# Patient Record
Sex: MALE | Race: WHITE | Employment: FULL TIME | URBAN - METROPOLITAN AREA
[De-identification: names, ages, dates, MRNs, and addresses within clinical notes are randomized per-mention and may not be internally consistent; named-entity substitution may affect disease eponyms.]

---

## 2018-01-09 PROBLEM — J18.9 PNEUMONIA: Status: ACTIVE | Noted: 2018-01-09

## 2021-06-09 ENCOUNTER — OFFICE VISIT (OUTPATIENT)
Dept: FAMILY MEDICINE CLINIC | Age: 31
End: 2021-06-09
Payer: COMMERCIAL

## 2021-06-09 VITALS
OXYGEN SATURATION: 98 % | WEIGHT: 174 LBS | BODY MASS INDEX: 23.57 KG/M2 | HEART RATE: 50 BPM | SYSTOLIC BLOOD PRESSURE: 112 MMHG | DIASTOLIC BLOOD PRESSURE: 62 MMHG | HEIGHT: 72 IN

## 2021-06-09 DIAGNOSIS — B18.2 CHRONIC HEPATITIS C WITHOUT HEPATIC COMA (HCC): ICD-10-CM

## 2021-06-09 DIAGNOSIS — F11.10 HEROIN ABUSE (HCC): ICD-10-CM

## 2021-06-09 DIAGNOSIS — R55 SYNCOPE, UNSPECIFIED SYNCOPE TYPE: Primary | ICD-10-CM

## 2021-06-09 PROCEDURE — G8420 CALC BMI NORM PARAMETERS: HCPCS | Performed by: NURSE PRACTITIONER

## 2021-06-09 PROCEDURE — 99204 OFFICE O/P NEW MOD 45 MIN: CPT | Performed by: NURSE PRACTITIONER

## 2021-06-09 PROCEDURE — G8427 DOCREV CUR MEDS BY ELIG CLIN: HCPCS | Performed by: NURSE PRACTITIONER

## 2021-06-09 PROCEDURE — 4004F PT TOBACCO SCREEN RCVD TLK: CPT | Performed by: NURSE PRACTITIONER

## 2021-06-09 RX ORDER — BUPRENORPHINE AND NALOXONE 8; 2 MG/1; MG/1
1 FILM, SOLUBLE BUCCAL; SUBLINGUAL DAILY
COMMUNITY

## 2021-06-09 SDOH — ECONOMIC STABILITY: FOOD INSECURITY: WITHIN THE PAST 12 MONTHS, THE FOOD YOU BOUGHT JUST DIDN'T LAST AND YOU DIDN'T HAVE MONEY TO GET MORE.: NEVER TRUE

## 2021-06-09 SDOH — ECONOMIC STABILITY: FOOD INSECURITY: WITHIN THE PAST 12 MONTHS, YOU WORRIED THAT YOUR FOOD WOULD RUN OUT BEFORE YOU GOT MONEY TO BUY MORE.: NEVER TRUE

## 2021-06-09 ASSESSMENT — ENCOUNTER SYMPTOMS
SHORTNESS OF BREATH: 0
VOMITING: 0
DIARRHEA: 0
NAUSEA: 0
COUGH: 0

## 2021-06-09 ASSESSMENT — PATIENT HEALTH QUESTIONNAIRE - PHQ9
SUM OF ALL RESPONSES TO PHQ9 QUESTIONS 1 & 2: 2
2. FEELING DOWN, DEPRESSED OR HOPELESS: 1
SUM OF ALL RESPONSES TO PHQ QUESTIONS 1-9: 2
SUM OF ALL RESPONSES TO PHQ QUESTIONS 1-9: 2
1. LITTLE INTEREST OR PLEASURE IN DOING THINGS: 1
SUM OF ALL RESPONSES TO PHQ QUESTIONS 1-9: 2

## 2021-06-09 ASSESSMENT — SOCIAL DETERMINANTS OF HEALTH (SDOH): HOW HARD IS IT FOR YOU TO PAY FOR THE VERY BASICS LIKE FOOD, HOUSING, MEDICAL CARE, AND HEATING?: SOMEWHAT HARD

## 2021-06-09 NOTE — PROGRESS NOTES
Packs/day: 1.00     Types: Cigarettes     Quit date: 1/1/2018     Years since quitting: 3.4    Smokeless tobacco: Current User   Substance Use Topics    Alcohol use: No        Vitals:    06/09/21 0808   BP: 112/62   Site: Left Upper Arm   Position: Sitting   Cuff Size: Medium Adult   Pulse: 50   SpO2: 98%   Weight: 174 lb (78.9 kg)   Height: 6' (1.829 m)     Estimated body mass index is 23.6 kg/m² as calculated from the following:    Height as of this encounter: 6' (1.829 m). Weight as of this encounter: 174 lb (78.9 kg). Physical Exam  Vitals and nursing note reviewed. Constitutional:       General: He is not in acute distress. Appearance: Normal appearance. He is well-developed. He is not ill-appearing, toxic-appearing or diaphoretic. HENT:      Head: Normocephalic and atraumatic. Eyes:      Conjunctiva/sclera: Conjunctivae normal.      Pupils: Pupils are equal, round, and reactive to light. Neck:      Vascular: No carotid bruit. Cardiovascular:      Rate and Rhythm: Normal rate and regular rhythm. Heart sounds: Normal heart sounds. No murmur heard. No friction rub. No gallop. Pulmonary:      Effort: Pulmonary effort is normal. No respiratory distress. Breath sounds: Normal breath sounds. No stridor. No wheezing, rhonchi or rales. Abdominal:      General: Abdomen is flat. Bowel sounds are normal. There is no distension. Palpations: Abdomen is soft. There is no mass. Tenderness: There is no abdominal tenderness. There is no guarding or rebound. Hernia: No hernia is present. Musculoskeletal:      Right lower leg: No edema. Left lower leg: No edema. Neurological:      General: No focal deficit present. Mental Status: He is alert and oriented to person, place, and time. Mental status is at baseline. Cranial Nerves: No cranial nerve deficit. ASSESSMENT/PLAN:  1.  Syncope, unspecified syncope type  - [address] [facility]    2. Chronic hepatitis C without hepatic coma (HCC)  - HEPATIC FUNCTION PANEL; Future  - HEPATITIS C RNA, QUANTITATIVE, PCR; Future    3. Heroin abuse ([address] in remission- on suboxone, reportedly clean since 2018      Referral to cardiology for syncope and bradycardia. Additionally put in referral to neurology as patient reports seizure-like activity. Ordered hepatitis C quant and hepatic function panel for further evaluation of his chronic hepatitis C. An electronic signature was used to authenticate this note.     --Crista Appiah, DESMOND - CNP on 6/9/2021 at 9:15 AM

## 2021-06-30 ENCOUNTER — OFFICE VISIT (OUTPATIENT)
Dept: CARDIOLOGY CLINIC | Age: 31
End: 2021-06-30
Payer: COMMERCIAL

## 2021-06-30 ENCOUNTER — TELEPHONE (OUTPATIENT)
Dept: CARDIOLOGY CLINIC | Age: 31
End: 2021-06-30

## 2021-06-30 VITALS
WEIGHT: 171.5 LBS | HEART RATE: 66 BPM | SYSTOLIC BLOOD PRESSURE: 100 MMHG | HEIGHT: 72 IN | DIASTOLIC BLOOD PRESSURE: 52 MMHG | OXYGEN SATURATION: 98 % | BODY MASS INDEX: 23.23 KG/M2

## 2021-06-30 DIAGNOSIS — R42 DIZZINESS: ICD-10-CM

## 2021-06-30 DIAGNOSIS — R55 SYNCOPE, UNSPECIFIED SYNCOPE TYPE: Primary | ICD-10-CM

## 2021-06-30 PROCEDURE — 93000 ELECTROCARDIOGRAM COMPLETE: CPT | Performed by: INTERNAL MEDICINE

## 2021-06-30 PROCEDURE — 99244 OFF/OP CNSLTJ NEW/EST MOD 40: CPT | Performed by: INTERNAL MEDICINE

## 2021-06-30 PROCEDURE — 93270 REMOTE 30 DAY ECG REV/REPORT: CPT | Performed by: INTERNAL MEDICINE

## 2021-06-30 PROCEDURE — G8427 DOCREV CUR MEDS BY ELIG CLIN: HCPCS | Performed by: INTERNAL MEDICINE

## 2021-06-30 PROCEDURE — G8420 CALC BMI NORM PARAMETERS: HCPCS | Performed by: INTERNAL MEDICINE

## 2021-06-30 NOTE — LETTER
[facility]   Cardiac Consultation    Referring Provider:  DESMOND Abdalla - JENNIFER     Chief Complaint   Patient presents with    New Patient    Loss of Consciousness        History of Present Illness:   Ayad Duncan 1990 is here as a NEW patient for syncope. He has a history of Hep C and heroin abuse. His echo from 01/10/18 showed his EF was 55%. Cannot r/o vegetation. AI recommend but not completed. Today he was started on suboxone in 04/2021. He had four episodes of syncope after waking for about 10 mins. His mother states he was unresponsive, jerking, eyes rolling, and incontinence. He denies biting tongue. He will become real stiff. The episodes last about 1.5 minutes. His last episode was on Monday 06/28/21. The episodes only occur in the morning. He was sitting during the episode. No ppt event. Resolved spontaneously. He reports occasional dizziness at work. He tries to stay hydrated. He denies CP, SOB, palpitations, or syncope. 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 He is still smoking and uses marijuana. Past Medical History:   has a past medical history of Drug abuse ([address] Surgical History:   has a past surgical history that includes Mandible surgery. Social History:   reports that he has been smoking cigarettes. He has been smoking about 1.00 pack per day. He uses smokeless tobacco. He reports current drug use. Drug: Marijuana. He reports that he does not drink alcohol. Family History:  Great grandparents with CAD age 72. Home Medications:  Prior to Admission medications    Medication Sig Start Date End Date Taking? Authorizing Provider   buprenorphine-naloxone (SUBOXONE) 8-2 MG FILM SL film Place 1 Film under the tongue daily. Yes Historical Provider, MD        Allergies:  Patient has no known allergies. Review of Systems:   · Constitutional: there has been no unanticipated weight loss.  There's been no change in energy level, sleep pattern, or activity level. · Eyes: No visual changes or diplopia. No scleral icterus. · ENT: No Headaches, hearing loss or vertigo. No mouth sores or sore throat. · Cardiovascular: Reviewed in HPI  · Respiratory: No cough or wheezing, no sputum production. No hematemesis. · Gastrointestinal: No abdominal pain, appetite loss, blood in stools. No change in bowel or bladder habits. · Genitourinary: No dysuria, trouble voiding, or hematuria. · Musculoskeletal:  No gait disturbance, weakness or joint complaints. · Integumentary: No rash or pruritis. · Neurological: No headache, diplopia, change in muscle strength, numbness or tingling. No change in gait, balance, coordination, mood, affect, memory, mentation, behavior. · Psychiatric: No anxiety, no depression. · Endocrine: No malaise, fatigue or temperature intolerance. No excessive thirst, fluid intake, or urination. No tremor. · Hematologic/Lymphatic: No abnormal bruising or bleeding, blood clots or swollen lymph nodes. · Allergic/Immunologic: No nasal congestion or hives. Physical Examination:    Vitals:    06/30/21 1358   BP: (!) 100/52   Pulse:    SpO2:         Constitutional and General Appearance: NAD   Respiratory:  · Normal excursion and expansion without use of accessory muscles  · Resp Auscultation: Normal breath sounds without dullness  Cardiovascular:  · The apical impulses not displaced  · Heart tones are crisp and normal  · Cervical veins are not engorged  · The carotid upstroke is normal in amplitude and contour without delay or bruit  · Normal S1S2, No S3, No Murmur  · Peripheral pulses are symmetrical and full  · There is no clubbing, cyanosis of the extremities.   · No edema  · Femoral Arteries: 2+ and equal  · Pedal Pulses: 2+ and equal   Abdomen:  · No masses or tenderness  · Liver/Spleen: No Abnormalities Noted  Neurological/Psychiatric:  · Alert and oriented in all spheres  · Moves all extremities well  · Exhibits normal gait balance and coordination  · No abnormalities of mood, affect, memory, mentation, or behavior are noted    Echo: 01/10/18  Summary   Normal LV systolic function with an estimated EF of 55%. Normal left ventricular diastolic filling pressure. The mitral valve leaflets appear mildly myxomatous. There is mild thickening of TV and possible echodensity seen on certain   views. Cannot r/o vegetation and recommend AI if clinically indicated. Trivial tricuspid regurgitation and mild pulmonic regurgitation   Systolic pulmonary artery pressure (SPAP) is normal and estimated at 29mmHg   (RA pressure 15mmHg). Assessment:   Syncope - hx s/o seizure. Consider cardiac  Bradycardia - pt reports noted in past  Relative hypotension   Hep C  History of heroin abuse    Plan:  Echo   30 day cardiac monitor  Check blood pressure and heart rate at home a few times per week- keep a log with dates and times and bring to office visit   Regular exercise and following a healthy diet encouraged   Follow up with neurology   Stay well hydrated with water or powerade/gatorade  Follow up with me in 7-8 weeks    The scribes documentation has been prepared under my direction and personally reviewed by me in its entirety. I confirm that the note above accurately reflects all work, treatment, procedures, and medical decision making performed by me. Dr. Cathy Fang MD    Scribe's attestation: This note was scribed in the presence of Dr. Cathy Fang by Giulia Reynolds RN     Thank you for allowing me to participate in the care of this individual.      Jin Lloyd.  Dima Trinh M.D., Didier Francois

## 2021-06-30 NOTE — PATIENT INSTRUCTIONS
Plan:  Echo to evaluate heart structure   30 day cardiac monitor for syncope  Cardiac medications reviewed including indications and pertinent side effects    Check blood pressure and heart rate at home a few times per week- keep a log with dates and times and bring to office visit   Regular exercise and following a healthy diet encouraged   Follow up with neurology   Stay well hydrated with water or powerade/gatorade  Follow up with me in 7-8 weeks    Your provider has ordered testing for further evaluation. An order/prescription has been included in your paper work.  To schedule outpatient testing, contact Central Scheduling by calling [address] [place] [address] ([phone]).

## 2021-06-30 NOTE — PROGRESS NOTES
sleep pattern, or activity level. · Eyes: No visual changes or diplopia. No scleral icterus. · ENT: No Headaches, hearing loss or vertigo. No mouth sores or sore throat. · Cardiovascular: Reviewed in HPI  · Respiratory: No cough or wheezing, no sputum production. No hematemesis. · Gastrointestinal: No abdominal pain, appetite loss, blood in stools. No change in bowel or bladder habits. · Genitourinary: No dysuria, trouble voiding, or hematuria. · Musculoskeletal:  No gait disturbance, weakness or joint complaints. · Integumentary: No rash or pruritis. · Neurological: No headache, diplopia, change in muscle strength, numbness or tingling. No change in gait, balance, coordination, mood, affect, memory, mentation, behavior. · Psychiatric: No anxiety, no depression. · Endocrine: No malaise, fatigue or temperature intolerance. No excessive thirst, fluid intake, or urination. No tremor. · Hematologic/Lymphatic: No abnormal bruising or bleeding, blood clots or swollen lymph nodes. · Allergic/Immunologic: No nasal congestion or hives. Physical Examination:    Vitals:    06/30/21 1358   BP: (!) 100/52   Pulse:    SpO2:         Constitutional and General Appearance: NAD   Respiratory:  · Normal excursion and expansion without use of accessory muscles  · Resp Auscultation: Normal breath sounds without dullness  Cardiovascular:  · The apical impulses not displaced  · Heart tones are crisp and normal  · Cervical veins are not engorged  · The carotid upstroke is normal in amplitude and contour without delay or bruit  · Normal S1S2, No S3, No Murmur  · Peripheral pulses are symmetrical and full  · There is no clubbing, cyanosis of the extremities.   · No edema  · Femoral Arteries: 2+ and equal  · Pedal Pulses: 2+ and equal   Abdomen:  · No masses or tenderness  · Liver/Spleen: No Abnormalities Noted  Neurological/Psychiatric:  · Alert and oriented in all spheres  · Moves all extremities well  · Exhibits normal gait balance and coordination  · No abnormalities of mood, affect, memory, mentation, or behavior are noted    Echo: 01/10/18  Summary   Normal LV systolic function with an estimated EF of 55%. Normal left ventricular diastolic filling pressure. The mitral valve leaflets appear mildly myxomatous. There is mild thickening of TV and possible echodensity seen on certain   views. Cannot r/o vegetation and recommend AI if clinically indicated. Trivial tricuspid regurgitation and mild pulmonic regurgitation   Systolic pulmonary artery pressure (SPAP) is normal and estimated at 29mmHg   (RA pressure 15mmHg). Assessment:   Syncope - hx s/o seizure. Consider cardiac  Bradycardia - pt reports noted in past  Relative hypotension   Hep C  History of heroin abuse    Plan:  Echo   30 day cardiac monitor  Check blood pressure and heart rate at home a few times per week- keep a log with dates and times and bring to office visit   Regular exercise and following a healthy diet encouraged   Follow up with neurology   Stay well hydrated with water or powerade/gatorade  Follow up with me in 7-8 weeks    The scribes documentation has been prepared under my direction and personally reviewed by me in its entirety. I confirm that the note above accurately reflects all work, treatment, procedures, and medical decision making performed by me. Dr. Patrick Baker MD    Scribe's attestation: This note was scribed in the presence of Dr. Patrick Baker by Janelle Sanchez RN     Thank you for allowing me to participate in the care of this individual.      Nathan Allen M.D., Evangelina Graham

## 2021-07-03 ENCOUNTER — HOSPITAL ENCOUNTER (EMERGENCY)
Age: 31
Discharge: HOME | End: 2021-07-03
Attending: EMERGENCY MEDICINE
Payer: COMMERCIAL

## 2021-07-03 ENCOUNTER — APPOINTMENT (OUTPATIENT)
Dept: CT IMAGING | Age: 31
End: 2021-07-03
Payer: COMMERCIAL

## 2021-07-03 ENCOUNTER — TELEPHONE (OUTPATIENT)
Dept: CARDIOLOGY | Age: 31
End: 2021-07-03

## 2021-07-03 VITALS
HEIGHT: 72 IN | DIASTOLIC BLOOD PRESSURE: 59 MMHG | TEMPERATURE: 98 F | OXYGEN SATURATION: 96 % | HEART RATE: 44 BPM | BODY MASS INDEX: 23.7 KG/M2 | RESPIRATION RATE: 10 BRPM | SYSTOLIC BLOOD PRESSURE: 98 MMHG | WEIGHT: 175 LBS

## 2021-07-03 DIAGNOSIS — R56.9 SEIZURE (HCC): Primary | ICD-10-CM

## 2021-07-03 LAB
A/G RATIO: 1.5 (ref 1.1–2.2)
ALBUMIN SERPL-MCNC: 4 G/DL (ref 3.4–5)
ALP BLD-CCNC: 51 U/L (ref 40–129)
ALT SERPL-CCNC: 63 U/L (ref 10–40)
ANION GAP SERPL CALCULATED.3IONS-SCNC: 7 MMOL/L (ref 3–16)
AST SERPL-CCNC: 50 U/L (ref 15–37)
BASOPHILS ABSOLUTE: 0 K/UL (ref 0–0.2)
BASOPHILS RELATIVE PERCENT: 0.8 %
BILIRUB SERPL-MCNC: 1 MG/DL (ref 0–1)
BUN BLDV-MCNC: 16 MG/DL (ref 7–20)
CALCIUM SERPL-MCNC: 9 MG/DL (ref 8.3–10.6)
CHLORIDE BLD-SCNC: 101 MMOL/L (ref 99–110)
CO2: 27 MMOL/L (ref 21–32)
CREAT SERPL-MCNC: 0.9 MG/DL (ref 0.9–1.3)
EOSINOPHILS ABSOLUTE: 0.2 K/UL (ref 0–0.6)
EOSINOPHILS RELATIVE PERCENT: 4.4 %
ETHANOL: NORMAL MG/DL (ref 0–0.08)
GFR AFRICAN AMERICAN: >60
GFR NON-AFRICAN AMERICAN: >60
GLOBULIN: 2.7 G/DL
GLUCOSE BLD-MCNC: 100 MG/DL (ref 70–99)
HCT VFR BLD CALC: 43.3 % (ref 40.5–52.5)
HEMOGLOBIN: 14.5 G/DL (ref 13.5–17.5)
LYMPHOCYTES ABSOLUTE: 1.7 K/UL (ref 1–5.1)
LYMPHOCYTES RELATIVE PERCENT: 34.2 %
MCH RBC QN AUTO: 28.8 PG (ref 26–34)
MCHC RBC AUTO-ENTMCNC: 33.4 G/DL (ref 31–36)
MCV RBC AUTO: 86.1 FL (ref 80–100)
MONOCYTES ABSOLUTE: 0.3 K/UL (ref 0–1.3)
MONOCYTES RELATIVE PERCENT: 6.7 %
NEUTROPHILS ABSOLUTE: 2.7 K/UL (ref 1.7–7.7)
NEUTROPHILS RELATIVE PERCENT: 53.9 %
PDW BLD-RTO: 15 % (ref 12.4–15.4)
PLATELET # BLD: 178 K/UL (ref 135–450)
PMV BLD AUTO: 7.9 FL (ref 5–10.5)
POTASSIUM REFLEX MAGNESIUM: 3.8 MMOL/L (ref 3.5–5.1)
RBC # BLD: 5.03 M/UL (ref 4.2–5.9)
SODIUM BLD-SCNC: 135 MMOL/L (ref 136–145)
TOTAL PROTEIN: 6.7 G/DL (ref 6.4–8.2)
TROPONIN: <0.01 NG/ML
WBC # BLD: 5 K/UL (ref 4–11)

## 2021-07-03 PROCEDURE — 80053 COMPREHEN METABOLIC PANEL: CPT

## 2021-07-03 PROCEDURE — 82077 ASSAY SPEC XCP UR&BREATH IA: CPT

## 2021-07-03 PROCEDURE — 84484 ASSAY OF TROPONIN QUANT: CPT

## 2021-07-03 PROCEDURE — 99283 EMERGENCY DEPT VISIT LOW MDM: CPT

## 2021-07-03 PROCEDURE — 85025 COMPLETE CBC W/AUTO DIFF WBC: CPT

## 2021-07-03 PROCEDURE — 70450 CT HEAD/BRAIN W/O DYE: CPT

## 2021-07-03 RX ORDER — LEVETIRACETAM 500 MG/1
500 TABLET ORAL 2 TIMES DAILY
Qty: 60 TABLET | Refills: 1 | Status: SHIPPED | OUTPATIENT
Start: 2021-07-03 | End: 2021-09-23 | Stop reason: SDUPTHER

## 2021-07-03 NOTE — ED NOTES
Pt came in today with hx of sz pt had to wittiness SZ per mom at home. Pt arrive with a heart monitor in place. Pt awake alert orient X4 . Pt with sz pads in place per protocol. Md aware of all vital signs.       Danny Tenorio RN  07/03/21 7767

## 2021-07-03 NOTE — ED PROVIDER NOTES
Emergency Physician Note        Note Open Time: 9:50 AM EDT    Chief Complaint  Seizures (witnessed by mother. Pt had 2 seizures)       History of Present Illness  Fadumo Cary is a 28 y.o. male who presents to the ED for seizure. Patient reports he woke up this morning and had a smoke and then sat down and his mother witnessed him have a seizure. He states he can feel them coming on and his mother tells him later that he gets stiff and turned pale for about 30 seconds to a minute and afterwards is confused for a couple of minutes and then returns to normal now status. He was evaluated for this recently at [facility] and was told it might be a cardiac problem. He has followed up with cardiologist and was told that it is noncardiac but rather neurologic. He had 2 episodes this morning including one that involved urinary incontinence. Both occurred while seated and he did not fall or injure himself at all. He denies biting his tongue. He currently feels like he is at his baseline. He is on Suboxone but denies any other substance use. Patient reports he has chronic bradycardia. 10 systems reviewed, pertinent positives per HPI otherwise noted to be negative    I have reviewed the following from the nursing documentation:      Prior to Admission medications    Medication Sig Start Date End Date Taking? Authorizing Provider   buprenorphine-naloxone (SUBOXONE) 8-2 MG FILM SL film Place 1 Film under the tongue daily. Yes Historical Provider, MD       Allergies as of 07/03/2021    (No Known Allergies)       Past Medical History:   Diagnosis Date    Drug abuse [facility])         Surgical History:   Past Surgical History:   Procedure Laterality Date    MANDIBLE SURGERY          Family History:  History reviewed. No pertinent family history.     Social History     Socioeconomic History    Marital status: Single     Spouse name: Not on file    Number of children: Not on file    Years of education: Not on file    Highest education level: Not on file   Occupational History    Not on file   Tobacco Use    Smoking status: Current Every Day Smoker     Packs/day: 1.00     Types: Cigarettes     Last attempt to quit: 1/1/2018     Years since quitting: 3.5    Smokeless tobacco: Current User   Vaping Use    Vaping Use: Every day    Substances: Nicotine, THC, CBD, Flavoring   Substance and Sexual Activity    Alcohol use: No    Drug use: Yes     Types: Marijuana    Sexual activity: Not on file   Other Topics Concern    Not on file   Social History Narrative    Not on file     Social Determinants of Health     Financial Resource Strain: Medium Risk    Difficulty of Paying Living Expenses: Somewhat hard   Food Insecurity: No Food Insecurity    Worried About Running Out of Food in the Last Year: Never true    Jeanie of Food in the Last Year: Never true   Transportation Needs:     Lack of Transportation (Medical):  Lack of Transportation (Non-Medical):    Physical Activity:     Days of Exercise per Week:     Minutes of Exercise per Session:    Stress:     Feeling of Stress :    Social Connections:     Frequency of Communication with Friends and Family:     Frequency of Social Gatherings with Friends and Family:     Attends Yarsani Services:     Active Member of Clubs or Organizations:     Attends Club or Organization Meetings:     Marital Status:    Intimate Partner Violence:     Fear of Current or Ex-Partner:     Emotionally Abused:     Physically Abused:     Sexually Abused:        Nursing notes reviewed. ED Triage Vitals [07/03/21 0808]   Enc Vitals Group      /69      Pulse (!) 48      Resp 16      Temp 97.8 °F (36.6 °C)      Temp Source Oral      SpO2 99 %      Weight 175 lb (79.4 kg)      Height 6' (1.829 m)      Head Circumference       Peak Flow       Pain Score       Pain Loc       Pain Edu? Excl. in [address]? GENERAL:  Awake, alert.  Well developed, well nourished with no apparent distress. HENT:  Normocephalic, Atraumatic, moist mucous membranes. EYES:  Pupils equal round and reactive to light, Conjunctiva normal, extraocular movements normal.  NECK:  No meningeal signs, Supple. CHEST: Bradycardic and regular, chest wall non-tender. LUNGS:  Clear to auscultation bilaterally. ABDOMEN:  Soft, non-tender, no rebound, rigidity or guarding, non-distended, normal bowel sounds. No costovertebral angle tenderness to palpation. BACK:  No tenderness. EXTREMITIES:  Normal range of motion, no edema, no bony tenderness, no deformity, distal pulses present. SKIN: Warm, dry and intact. NEUROLOGIC: Awake, alert and oriented to person, place and time. Strength 5/5 in bilateral upper and lower extremities. Sensation is intact to light touch in the upper and lower extremities. Cranial Nerves 2-12 are intact. Patellar DTRs intact. Finger-to-nose intact. RADIOLOGY  X-RAYS:  I have reviewed radiologic plain film image(s). ALL OTHER NON-PLAIN FILM IMAGES SUCH AS CT, ULTRASOUND AND MRI HAVE BEEN READ BY THE RADIOLOGIST. CT head without contrast   Final Result   No acute intracranial abnormality.               LABS  Labs Reviewed   COMPREHENSIVE METABOLIC PANEL W/ REFLEX TO MG FOR LOW K - Abnormal; Notable for the following components:       Result Value    Sodium 135 (*)     Glucose 100 (*)     ALT 63 (*)     AST 50 (*)     All other components within normal limits    Narrative:     Performed at:  [place] [address] [place]   Phone [phone]   CBC WITH AUTO DIFFERENTIAL    Narrative:     Performed at:  [address], [facility] [place]   Phone [phone]   TROPONIN    Narrative:     Performed at:  [facility]) [address], [facility] [place]   Phone [phone]   ETHANOL    Narrative:     Performed at:  [facility]) formerly [facility]  [address],  [place], [facility]   Phone [phone]   URINE RT REFLEX  [address] MAKING        Patient is sinus bradycardia on the telemetry in the room. I discussed initiating antiepileptic drug with the patient and he would like to get started on something given that he has had a total of 6 episodes in the last 3 months. I am also referring him to neurology for further evaluation. I advised the patient to return to the emergency department immediately for any new or worsening symptoms, such as headache, chest pain, shortness of breath or any new symptoms. The patient voiced agreement and understanding of the treatment plan. I estimate there is LOW risk for ACUTE CORONARY SYNDROME, INTRACRANIAL HEMORRHAGE, MALIGNANT DYSRHYTHMIA, MENINGITIS, PNEUMONIA, PULMONARY EMBOLISM, SEPSIS, SUBARACHNOID HEMORRHAGE, SUBDURAL HEMATOMA, STROKE, or URINARY TRACT INFECTION, thus I consider the discharge disposition reasonable. Marbella Guardado and I have discussed the diagnosis and risks, and we agree with discharging home to follow-up with their primary doctor. We also discussed returning to the Emergency Department immediately if new or worsening symptoms occur. We have discussed the symptoms which are most concerning (e.g., changing or worsening pain, weakness, vomiting, fever) that necessitate immediate return. Final Impression    1. Seizure ([address]        Blood pressure 111/69, pulse (!) 46, temperature 98 °F (36.7 °C), resp. rate 13, height 6' (1.829 m), weight 175 lb (79.4 kg), SpO2 96 %. Patient was given scripts for the following medications. I counseled patient how to take these medications. New Prescriptions    LEVETIRACETAM (KEPPRA) 500 MG TABLET    Take 1 tablet by mouth 2 times daily       Disposition  Pt is in good condition upon Discharge to home. This chart was generated using the [address] [place] [address] [address] [employer] system.   I created this record but it may contain dictation errors.           Ethan Durán MD  07/03/21 100

## 2021-07-03 NOTE — ED NOTES
Bed: 03  Expected date:   Expected time:   Means of arrival:   Comments:  Glenn Dumont RN  07/03/21 5816

## 2021-07-03 NOTE — TELEPHONE ENCOUNTER
See message from [employer] monitoring company patient had 35-second and 19.5-second asystole pauses and triggered syncope. Call the patient for well check and he is presently in ambulance on his way to [facility].  We will evaluate on arrival.  Suggest urine drug screen even his history and neurologic evaluations as he states he thinks he had a seizure. This was planned as outpatient. We will also evaluate. Presently he is bradycardic in the 30s per company report.     Sean Stanford MD, [address]  [phone] [facility]  [phone] [address] [place] [address]

## 2021-07-12 ENCOUNTER — TELEPHONE (OUTPATIENT)
Dept: CARDIOLOGY CLINIC | Age: 31
End: 2021-07-12

## 2021-07-12 NOTE — TELEPHONE ENCOUNTER
Patient appears to have sinus pauses in the setting of epilepsy. I.e. Ictal asystole. Will need strict Compliance with anti epileptics (missed Dose Recently). Given length of pauses, I think EP Evaluation is reasonable. Can we expedite? Believe dr Arielle Linda had echo scheduled for him - would ensure he schedules. I will reach out to him to ensure he understands the plan.      RAMIRO

## 2021-07-12 NOTE — TELEPHONE ENCOUNTER
[facility]: 18 seconds pause- 737AM today. Will scan in report once received. Patient also had a 19 second and 35 second pause 7/3/2021 and went to the ED but was discharged home as these events were related to 2 witnessed seizures. He reports he did have a seizure this morning witnessed by mother  (10-15 seconds unconscious per mom). He reports he started Keppra on 7/3/2021, but missed a dose yesterday. He is also on suboxone. Says he feels well not. He reports that cardiology that saw him in ED was not concerned for cardiac cause, and was instructed to get echo outpatient. Gave patient number for scheduling. Your provider has ordered testing for further evaluation. An order/prescription has been included in your paper work.  To schedule outpatient testing, contact Central Scheduling by calling [facility] ([phone]).

## 2021-07-13 ENCOUNTER — TELEPHONE (OUTPATIENT)
Dept: CARDIOLOGY CLINIC | Age: 31
End: 2021-07-13

## 2021-07-13 DIAGNOSIS — R55 SYNCOPE, UNSPECIFIED SYNCOPE TYPE: ICD-10-CM

## 2021-07-13 DIAGNOSIS — R42 DIZZINESS: ICD-10-CM

## 2021-07-16 ENCOUNTER — TELEPHONE (OUTPATIENT)
Dept: CARDIOLOGY CLINIC | Age: 31
End: 2021-07-16

## 2021-07-16 ENCOUNTER — HOSPITAL ENCOUNTER (INPATIENT)
Age: 31
LOS: 5 days | Discharge: HOME | DRG: 171 | End: 2021-07-21
Attending: EMERGENCY MEDICINE | Admitting: INTERNAL MEDICINE
Payer: COMMERCIAL

## 2021-07-16 ENCOUNTER — APPOINTMENT (OUTPATIENT)
Dept: GENERAL RADIOLOGY | Age: 31
DRG: 171 | End: 2021-07-16
Payer: COMMERCIAL

## 2021-07-16 DIAGNOSIS — I46.9 CARDIAC ASYSTOLE (HCC): Primary | ICD-10-CM

## 2021-07-16 PROBLEM — G40.919 BREAKTHROUGH SEIZURE (HCC): Status: ACTIVE | Noted: 2021-07-16

## 2021-07-16 LAB
A/G RATIO: 1.6 (ref 1.1–2.2)
ALBUMIN SERPL-MCNC: 5.1 G/DL (ref 3.4–5)
ALP BLD-CCNC: 63 U/L (ref 40–129)
ALT SERPL-CCNC: 87 U/L (ref 10–40)
AMPHETAMINE SCREEN, URINE: ABNORMAL
ANION GAP SERPL CALCULATED.3IONS-SCNC: 10 MMOL/L (ref 3–16)
AST SERPL-CCNC: 66 U/L (ref 15–37)
BARBITURATE SCREEN URINE: ABNORMAL
BASOPHILS ABSOLUTE: 0.1 K/UL (ref 0–0.2)
BASOPHILS RELATIVE PERCENT: 0.9 %
BENZODIAZEPINE SCREEN, URINE: ABNORMAL
BILIRUB SERPL-MCNC: 1.3 MG/DL (ref 0–1)
BILIRUBIN URINE: NEGATIVE
BLOOD, URINE: NEGATIVE
BUN BLDV-MCNC: 15 MG/DL (ref 7–20)
CALCIUM SERPL-MCNC: 9.6 MG/DL (ref 8.3–10.6)
CANNABINOID SCREEN URINE: POSITIVE
CHLORIDE BLD-SCNC: 95 MMOL/L (ref 99–110)
CLARITY: CLEAR
CO2: 27 MMOL/L (ref 21–32)
COCAINE METABOLITE SCREEN URINE: ABNORMAL
COLOR: YELLOW
CREAT SERPL-MCNC: 0.9 MG/DL (ref 0.9–1.3)
EOSINOPHILS ABSOLUTE: 0.2 K/UL (ref 0–0.6)
EOSINOPHILS RELATIVE PERCENT: 2.5 %
GFR AFRICAN AMERICAN: >60
GFR NON-AFRICAN AMERICAN: >60
GLOBULIN: 3.1 G/DL
GLUCOSE BLD-MCNC: 108 MG/DL (ref 70–99)
GLUCOSE URINE: NEGATIVE MG/DL
HCT VFR BLD CALC: 46.9 % (ref 40.5–52.5)
HEMOGLOBIN: 15.9 G/DL (ref 13.5–17.5)
KETONES, URINE: NEGATIVE MG/DL
LEUKOCYTE ESTERASE, URINE: NEGATIVE
LYMPHOCYTES ABSOLUTE: 2 K/UL (ref 1–5.1)
LYMPHOCYTES RELATIVE PERCENT: 27.4 %
Lab: ABNORMAL
MAGNESIUM: 2.5 MG/DL (ref 1.8–2.4)
MCH RBC QN AUTO: 29 PG (ref 26–34)
MCHC RBC AUTO-ENTMCNC: 33.9 G/DL (ref 31–36)
MCV RBC AUTO: 85.3 FL (ref 80–100)
METHADONE SCREEN, URINE: ABNORMAL
MICROSCOPIC EXAMINATION: NORMAL
MONOCYTES ABSOLUTE: 0.6 K/UL (ref 0–1.3)
MONOCYTES RELATIVE PERCENT: 8.1 %
NEUTROPHILS ABSOLUTE: 4.4 K/UL (ref 1.7–7.7)
NEUTROPHILS RELATIVE PERCENT: 61.1 %
NITRITE, URINE: NEGATIVE
OPIATE SCREEN URINE: ABNORMAL
OXYCODONE URINE: ABNORMAL
PDW BLD-RTO: 14.6 % (ref 12.4–15.4)
PH UA: 6.5
PH UA: 6.5 (ref 5–8)
PHENCYCLIDINE SCREEN URINE: ABNORMAL
PLATELET # BLD: 229 K/UL (ref 135–450)
PMV BLD AUTO: 7.9 FL (ref 5–10.5)
POTASSIUM SERPL-SCNC: 4.9 MMOL/L (ref 3.5–5.1)
PROPOXYPHENE SCREEN: ABNORMAL
PROTEIN UA: NEGATIVE MG/DL
RBC # BLD: 5.5 M/UL (ref 4.2–5.9)
SODIUM BLD-SCNC: 132 MMOL/L (ref 136–145)
SPECIFIC GRAVITY UA: <=1.005 (ref 1–1.03)
TOTAL PROTEIN: 8.2 G/DL (ref 6.4–8.2)
TROPONIN: <0.01 NG/ML
URINE REFLEX TO CULTURE: NORMAL
URINE TYPE: NORMAL
UROBILINOGEN, URINE: 0.2 E.U./DL
WBC # BLD: 7.2 K/UL (ref 4–11)

## 2021-07-16 PROCEDURE — 80053 COMPREHEN METABOLIC PANEL: CPT

## 2021-07-16 PROCEDURE — 83735 ASSAY OF MAGNESIUM: CPT

## 2021-07-16 PROCEDURE — 81003 URINALYSIS AUTO W/O SCOPE: CPT

## 2021-07-16 PROCEDURE — 99284 EMERGENCY DEPT VISIT MOD MDM: CPT

## 2021-07-16 PROCEDURE — 84484 ASSAY OF TROPONIN QUANT: CPT

## 2021-07-16 PROCEDURE — 6370000000 HC RX 637 (ALT 250 FOR IP): Performed by: EMERGENCY MEDICINE

## 2021-07-16 PROCEDURE — 6360000002 HC RX W HCPCS: Performed by: EMERGENCY MEDICINE

## 2021-07-16 PROCEDURE — 2060000000 HC ICU INTERMEDIATE R&B

## 2021-07-16 PROCEDURE — 80307 DRUG TEST PRSMV CHEM ANLYZR: CPT

## 2021-07-16 PROCEDURE — 93005 ELECTROCARDIOGRAM TRACING: CPT | Performed by: EMERGENCY MEDICINE

## 2021-07-16 PROCEDURE — 2580000003 HC RX 258: Performed by: EMERGENCY MEDICINE

## 2021-07-16 PROCEDURE — 85025 COMPLETE CBC W/AUTO DIFF WBC: CPT

## 2021-07-16 PROCEDURE — 71045 X-RAY EXAM CHEST 1 VIEW: CPT

## 2021-07-16 RX ORDER — 0.9 % SODIUM CHLORIDE 0.9 %
1000 INTRAVENOUS SOLUTION INTRAVENOUS ONCE
Status: COMPLETED | OUTPATIENT
Start: 2021-07-16 | End: 2021-07-16

## 2021-07-16 RX ORDER — LORAZEPAM 2 MG/ML
2 INJECTION INTRAMUSCULAR EVERY 6 HOURS PRN
Status: DISCONTINUED | OUTPATIENT
Start: 2021-07-16 | End: 2021-07-20

## 2021-07-16 RX ORDER — NICOTINE 21 MG/24HR
1 PATCH, TRANSDERMAL 24 HOURS TRANSDERMAL ONCE
Status: COMPLETED | OUTPATIENT
Start: 2021-07-16 | End: 2021-07-17

## 2021-07-16 RX ORDER — ACETAMINOPHEN 325 MG/1
650 TABLET ORAL EVERY 6 HOURS PRN
Status: DISCONTINUED | OUTPATIENT
Start: 2021-07-16 | End: 2021-07-21 | Stop reason: HOSPADM

## 2021-07-16 RX ORDER — LEVETIRACETAM 500 MG/1
750 TABLET ORAL 2 TIMES DAILY
Status: DISCONTINUED | OUTPATIENT
Start: 2021-07-16 | End: 2021-07-19

## 2021-07-16 RX ORDER — SODIUM CHLORIDE 0.9 % (FLUSH) 0.9 %
5-40 SYRINGE (ML) INJECTION EVERY 12 HOURS SCHEDULED
Status: DISCONTINUED | OUTPATIENT
Start: 2021-07-16 | End: 2021-07-21 | Stop reason: HOSPADM

## 2021-07-16 RX ORDER — POLYETHYLENE GLYCOL 3350 17 G/17G
17 POWDER, FOR SOLUTION ORAL DAILY PRN
Status: DISCONTINUED | OUTPATIENT
Start: 2021-07-16 | End: 2021-07-21 | Stop reason: HOSPADM

## 2021-07-16 RX ORDER — SODIUM CHLORIDE 9 MG/ML
INJECTION, SOLUTION INTRAVENOUS CONTINUOUS
Status: DISCONTINUED | OUTPATIENT
Start: 2021-07-16 | End: 2021-07-17

## 2021-07-16 RX ORDER — SODIUM CHLORIDE 9 MG/ML
25 INJECTION, SOLUTION INTRAVENOUS PRN
Status: DISCONTINUED | OUTPATIENT
Start: 2021-07-16 | End: 2021-07-20 | Stop reason: SDUPTHER

## 2021-07-16 RX ORDER — ACETAMINOPHEN 650 MG/1
650 SUPPOSITORY RECTAL EVERY 6 HOURS PRN
Status: DISCONTINUED | OUTPATIENT
Start: 2021-07-16 | End: 2021-07-21 | Stop reason: HOSPADM

## 2021-07-16 RX ORDER — ONDANSETRON 2 MG/ML
4 INJECTION INTRAMUSCULAR; INTRAVENOUS EVERY 6 HOURS PRN
Status: DISCONTINUED | OUTPATIENT
Start: 2021-07-16 | End: 2021-07-20

## 2021-07-16 RX ORDER — ONDANSETRON 4 MG/1
4 TABLET, ORALLY DISINTEGRATING ORAL EVERY 8 HOURS PRN
Status: DISCONTINUED | OUTPATIENT
Start: 2021-07-16 | End: 2021-07-20

## 2021-07-16 RX ORDER — SODIUM CHLORIDE 0.9 % (FLUSH) 0.9 %
5-40 SYRINGE (ML) INJECTION PRN
Status: DISCONTINUED | OUTPATIENT
Start: 2021-07-16 | End: 2021-07-21 | Stop reason: HOSPADM

## 2021-07-16 RX ORDER — LORAZEPAM 2 MG/ML
2 INJECTION INTRAMUSCULAR ONCE
Status: COMPLETED | OUTPATIENT
Start: 2021-07-16 | End: 2021-07-16

## 2021-07-16 RX ORDER — BUPRENORPHINE AND NALOXONE 8; 2 MG/1; MG/1
1 FILM, SOLUBLE BUCCAL; SUBLINGUAL DAILY
Status: DISCONTINUED | OUTPATIENT
Start: 2021-07-17 | End: 2021-07-21 | Stop reason: HOSPADM

## 2021-07-16 RX ADMIN — SODIUM CHLORIDE 1000 ML: 9 INJECTION, SOLUTION INTRAVENOUS at 20:40

## 2021-07-16 RX ADMIN — LORAZEPAM 2 MG: 2 INJECTION INTRAMUSCULAR; INTRAVENOUS at 22:28

## 2021-07-16 NOTE — TELEPHONE ENCOUNTER
I spoke with patient and his mother and advised him to go to the ER. They are aware and will ne headed to [facility].

## 2021-07-16 NOTE — TELEPHONE ENCOUNTER
Lanny Caruso from [employer] called to report an urgent report from monitor to a nurse. Please all Lanny Caruso @ [phone] ext [phone].

## 2021-07-16 NOTE — TELEPHONE ENCOUNTER
Soumya pt's mother called regarding pts Echo scheduled on 7/28. Insurance is saying they need a PA. Soumya stated the insurance wants the PA faxed to [phone]. Please advise.

## 2021-07-16 NOTE — TELEPHONE ENCOUNTER
Can we please direct this patient to the hospital.  He is have repeated events. This may be ictal asystole but I am really not certain at this point. During last admission he was evaluated with neurology and he was not evaluated with electrophysiology. I would asked that he be admitted for EEG monitoring and EP evaluation so that we can sort out whether he is having asystole due to seizures or vice versa. Sounds as though his seizures are uncontrolled and needs change in therapy as is.  Thank you    RAMIRO

## 2021-07-16 NOTE — TELEPHONE ENCOUNTER
I called and spoke with [facility] with alley and ask that fax urgent report as we have not received it. [facility] states that patient had a 18 second episode of asystole. i called and spoke with patient. He states at 9:00 AM this morning he was wearing his monitor and was not feeling well and had one of his spells that he has been told are seizures. He states he removed his monitor while at work around 10:30 AM due to sweating and his patches not sticking. Monitor report is being scanned into media and routed to you.

## 2021-07-17 LAB
ANION GAP SERPL CALCULATED.3IONS-SCNC: 8 MMOL/L (ref 3–16)
BUN BLDV-MCNC: 15 MG/DL (ref 7–20)
CALCIUM SERPL-MCNC: 8.9 MG/DL (ref 8.3–10.6)
CHLORIDE BLD-SCNC: 104 MMOL/L (ref 99–110)
CO2: 26 MMOL/L (ref 21–32)
CREAT SERPL-MCNC: 0.9 MG/DL (ref 0.9–1.3)
EKG ATRIAL RATE: 70 BPM
EKG DIAGNOSIS: NORMAL
EKG P AXIS: 76 DEGREES
EKG P-R INTERVAL: 144 MS
EKG Q-T INTERVAL: 376 MS
EKG QRS DURATION: 108 MS
EKG QTC CALCULATION (BAZETT): 406 MS
EKG R AXIS: 85 DEGREES
EKG T AXIS: 65 DEGREES
EKG VENTRICULAR RATE: 70 BPM
GFR AFRICAN AMERICAN: >60
GFR NON-AFRICAN AMERICAN: >60
GLUCOSE BLD-MCNC: 101 MG/DL (ref 70–99)
POTASSIUM REFLEX MAGNESIUM: 4.2 MMOL/L (ref 3.5–5.1)
SODIUM BLD-SCNC: 138 MMOL/L (ref 136–145)
TSH REFLEX: 3.68 UIU/ML (ref 0.27–4.2)

## 2021-07-17 PROCEDURE — 84443 ASSAY THYROID STIM HORMONE: CPT

## 2021-07-17 PROCEDURE — 6360000002 HC RX W HCPCS: Performed by: INTERNAL MEDICINE

## 2021-07-17 PROCEDURE — 2580000003 HC RX 258: Performed by: INTERNAL MEDICINE

## 2021-07-17 PROCEDURE — 99254 IP/OBS CNSLTJ NEW/EST MOD 60: CPT | Performed by: PSYCHIATRY & NEUROLOGY

## 2021-07-17 PROCEDURE — 6360000002 HC RX W HCPCS: Performed by: PHYSICIAN ASSISTANT

## 2021-07-17 PROCEDURE — 99223 1ST HOSP IP/OBS HIGH 75: CPT | Performed by: INTERNAL MEDICINE

## 2021-07-17 PROCEDURE — 2060000000 HC ICU INTERMEDIATE R&B

## 2021-07-17 PROCEDURE — 6370000000 HC RX 637 (ALT 250 FOR IP): Performed by: INTERNAL MEDICINE

## 2021-07-17 PROCEDURE — 80048 BASIC METABOLIC PNL TOTAL CA: CPT

## 2021-07-17 PROCEDURE — 36415 COLL VENOUS BLD VENIPUNCTURE: CPT

## 2021-07-17 PROCEDURE — 93010 ELECTROCARDIOGRAM REPORT: CPT | Performed by: INTERNAL MEDICINE

## 2021-07-17 RX ORDER — LORAZEPAM 2 MG/ML
1 INJECTION INTRAMUSCULAR ONCE
Status: COMPLETED | OUTPATIENT
Start: 2021-07-17 | End: 2021-07-17

## 2021-07-17 RX ORDER — MIDODRINE HYDROCHLORIDE 5 MG/1
5 TABLET ORAL
Status: DISCONTINUED | OUTPATIENT
Start: 2021-07-17 | End: 2021-07-19

## 2021-07-17 RX ORDER — PROCHLORPERAZINE EDISYLATE 5 MG/ML
10 INJECTION INTRAMUSCULAR; INTRAVENOUS EVERY 6 HOURS PRN
Status: DISCONTINUED | OUTPATIENT
Start: 2021-07-17 | End: 2021-07-21 | Stop reason: HOSPADM

## 2021-07-17 RX ORDER — SODIUM CHLORIDE, SODIUM LACTATE, POTASSIUM CHLORIDE, CALCIUM CHLORIDE 600; 310; 30; 20 MG/100ML; MG/100ML; MG/100ML; MG/100ML
INJECTION, SOLUTION INTRAVENOUS CONTINUOUS
Status: DISCONTINUED | OUTPATIENT
Start: 2021-07-17 | End: 2021-07-19

## 2021-07-17 RX ORDER — HYDROXYZINE HYDROCHLORIDE 50 MG/ML
50 INJECTION, SOLUTION INTRAMUSCULAR EVERY 6 HOURS PRN
Status: DISCONTINUED | OUTPATIENT
Start: 2021-07-17 | End: 2021-07-20

## 2021-07-17 RX ADMIN — Medication 10 ML: at 00:29

## 2021-07-17 RX ADMIN — SODIUM CHLORIDE: 9 INJECTION, SOLUTION INTRAVENOUS at 00:33

## 2021-07-17 RX ADMIN — LEVETIRACETAM 750 MG: 500 TABLET ORAL at 10:02

## 2021-07-17 RX ADMIN — BUPRENORPHINE AND NALOXONE 1 FILM: 8; 2 FILM BUCCAL; SUBLINGUAL at 10:02

## 2021-07-17 RX ADMIN — SODIUM CHLORIDE, POTASSIUM CHLORIDE, SODIUM LACTATE AND CALCIUM CHLORIDE: 600; 310; 30; 20 INJECTION, SOLUTION INTRAVENOUS at 22:19

## 2021-07-17 RX ADMIN — ENOXAPARIN SODIUM 40 MG: 40 INJECTION SUBCUTANEOUS at 10:02

## 2021-07-17 RX ADMIN — LEVETIRACETAM 750 MG: 500 TABLET ORAL at 00:28

## 2021-07-17 RX ADMIN — ONDANSETRON 4 MG: 2 INJECTION INTRAMUSCULAR; INTRAVENOUS at 12:12

## 2021-07-17 RX ADMIN — LEVETIRACETAM 750 MG: 500 TABLET ORAL at 20:16

## 2021-07-17 RX ADMIN — LORAZEPAM 1 MG: 2 INJECTION INTRAMUSCULAR; INTRAVENOUS at 20:54

## 2021-07-17 NOTE — CONSULTS
[address]     Consultation   Date: 7/17/2021  Reason for Consultation: syncope,Sinus pause,   Consult Requesting Physician: Joshua Hawthorne MD     Chief Complaint   Patient presents with    Irregular Heart Beat     reports wearing halter monitor x2 weeks and recieved a call from MD that he needed to go to ED immediately for evaluation of irregular heartbeat      HPI: Sherly Zambrano is a 28 y.o. male who was admitted for syncope and at least 19-second sinus pause. He was in [facility] for 10 years and was released in April this year. He has been very athletic all his life. He has never had history of syncope in the past.  Over the last 2 weeks he started having episodes of syncope. He describes his episodes mostly happening in the morning after he wakes up while he is standing for short while or when he goes to the bathroom. To most recent episodes that made him come in to see cardiologist, Dr. Leila Green, were 1 when he was in the bathroom and his mom noticed that he was not coming out and try to open the door and found him to have seizure-like activity. Second 1 was the next morning when he woke up and he was sitting and he went into is what he describes as a clonic movement for a few seconds and then came back. Dr. Leila Green ordered a monitor. Since then he has had probably 3 episodes of fainting. The last 1 was yesterday at 9:00 when he went to bathroom after he woke up and was coming back and felt very lightheaded almost like passing out with imbalance and had to go to bed and lie down however he did not pass out. I have only access to this recording and the chart. It shows a sinus pause of 18.5 seconds length. He has progressive bradycardia before the pause happens. He also has feelings of being hot with mild nausea before these episodes. But he most consistently looks pale according to his mother was observed him with these episodes afterwards and breaks in a cold sweat.   He has been trying to be eating healthy and exercise and does not drink any soda caffeine. Past Medical History:   Diagnosis Date    Drug abuse [facility])         Past Surgical History:   Procedure Laterality Date    MANDIBLE SURGERY         No Known Allergies    Social History:  Reviewed. reports that he has been smoking cigarettes. He has been smoking about 1.00 pack per day. He uses smokeless tobacco. He reports current drug use. Drug: Marijuana. He reports that he does not drink alcohol. Family History:  Reviewed. family history is not on file. Review of System:  All other systems reviewed and are negative except for that noted above. Pertinent negatives are:     · General: negative for fever, chills   · Ophthalmic ROS: negative for - eye pain or loss of vision  · ENT ROS: negative for - headaches, sore throat   · Respiratory: negative for - cough, sputum  · Cardiovascular: Reviewed in HPI  · Gastrointestinal: negative for - abdominal pain, diarrhea, N/V  · Hematology: negative for - bleeding, blood clots, bruising or jaundice  · Genito-Urinary:  negative for - Dysuria or incontinence  · Musculoskeletal: negative for - Joint swelling, muscle pain  · Neurological: negative for - confusion, dizziness, headaches   · Psychiatric: No anxiety, no depression.   · Dermatological: negative for - rash    Physical Examination:  Vitals:    21 1000   BP: 102/64   Pulse: 57   Resp: 16   Temp: 97.3 °F (36.3 °C)   SpO2: 99%      In: 1000   Out: -    Wt Readings from Last 3 Encounters:   21 166 lb 3.2 oz (75.4 kg)   21 175 lb (79.4 kg)   21 171 lb 8 oz (77.8 kg)     Temp  Av.5 °F (36.4 °C)  Min: 97.3 °F (36.3 °C)  Max: 98 °F (36.7 °C)  Pulse  Av  Min: 56  Max: 84  BP  Min: 102/64  Max: 132/74  SpO2  Av.1 %  Min: 96 %  Max: 100 %    Intake/Output Summary (Last 24 hours) at 2021 1321  Last data filed at 2021 2314  Gross per 24 hour   Intake 1000 ml   Output --   Net 1000 ml · Telemetry: Sinus rhythm , sinus arrhythmia  · Constitutional: Oriented. No distress. · Head: Normocephalic and atraumatic. · Mouth/Throat: Oropharynx is clear and moist.   · Eyes: Conjunctivae normal. EOM are normal.   · Neck: Neck supple. No rigidity. No JVD present. · Cardiovascular: Normal rate, regular rhythm, S1&S2. · Pulmonary/Chest: Bilateral respiratory sounds. No wheezes, No rhonchi. · Abdominal: Soft. Bowel sounds present. No distension, No tenderness. · Musculoskeletal: No tenderness. No edema    · Lymphadenopathy: Has no cervical adenopathy. · Neurological: Alert and oriented. Cranial nerve appears intact, No Gross deficit   · Skin: Skin is warm and dry. No rash noted. · Psychiatric: Has a normal behavior     Labs, diagnostic and imaging results reviewed. Reviewed. Recent Labs     07/16/21 2009 07/17/21  0502   * 138   K 4.9 4.2   CL 95* 104   CO2 27 26   BUN 15 15   CREATININE 0.9 0.9     Recent Labs     07/16/21 2009   WBC 7.2   HGB 15.9   HCT 46.9   MCV 85.3        Lab Results   Component Value Date    TROPONINI <0.01 07/16/2021     No results found for: BNP  Lab Results   Component Value Date    PROTIME 13.9 01/09/2018    INR 1.23 01/09/2018     No results found for: CHOL, HDL, TRIG    ECG: Sinus rhythm  Echo: 1/9/2018  Conclusions      Summary   Normal LV systolic function with an estimated EF of 55%. Normal left ventricular diastolic filling pressure. The mitral valve leaflets appear mildly myxomatous. There is mild thickening of TV and possible echodensity seen on certain   views. Cannot r/o vegetation and recommend AI if clinically indicated. Trivial tricuspid regurgitation and mild pulmonic regurgitation   Systolic pulmonary artery pressure (SPAP) is normal and estimated at 29mmHg   (RA pressure 15mmHg).   Cath:     Scheduled Meds:   buprenorphine-naloxone  1 Film Sublingual Daily    levETIRAcetam  750 mg Oral BID    sodium chloride with vasovagal syncope and from bradycardia associated with seizure is important because he is a young man and the decision about a pacemaker could be important for the rest of his life. I think it is reasonable to do a tilt table test to see if he will have the same episodes during tilt which will make it more likely that asystole is related to vasovagal.  Also review of the other tracings that he has is helpful. Electrophysiology service needs to be engaged in this decision-making. I explained to him that  if his diagnosis is vasovagal syncope, medication can sometimes help and avoid these episodes. However despite medication he may still have episodes of fainting and at the end of the day require pacemaker, it is also likely that he will need to take meds even if he has a pacemaker. It is hard to avoid the pacemaker with pauses as long as 15-30 seconds even if related to vasovagal syncope. He will likely need to have both to avoid further episodes. It is not clear to me what triggered this condition in him. Continue hydration for the weekend. He does not need to be n.p.o. for a tilt table test. But if a pacemaker is planned, he needs to be NPO. I will start him on midodrine over the weekend to see if he has any side effects and if  he can tolerate it. However please hold the midodrine dose tomorrow evening and on Monday if the tilt table test is to be done. -Vomiting    Evaluation by primary team.  Monitor and make sure that his electrolytes are imbalanced when he is hydrated. Thank you for allowing me to participate in the care of [facility]     NOTE: This report was transcribed using voice recognition software. Every effort was made to ensure accuracy, however, inadvertent computerized transcription errors may be present.

## 2021-07-17 NOTE — PROGRESS NOTES
Comprehensive Nutrition Assessment    Type and Reason for Visit:  Initial, Positive Nutrition Screen (MST = 2 wt loss, poor po intake)    Nutrition Recommendations/Plan:   1. Continue general diet  2. Add Ensure w/ meals   3. Encourage po intakes  4. Monitor po intakes, nutrition adequacy, weights, pertinent labs, BMs    Nutrition Assessment:  Nutrition screen for wt loss and poor po intake. Pt admitted for suspected seizure. Pt is nutritionally compromised AEB reported poor po intake and wt loss. Pt reports his appetite has been good but has been having episodes of nausea and vomiting after meals which has caused poor po intakes. Pt reports wt loss ~10 lbs over the last month. He drinks protein drinks at home and was agreeable to drinking Ensure to promote kcals/protien. Will continue to monitor. Malnutrition Assessment:  Malnutrition Status: At risk for malnutrition (Comment)    Context:  Acute Illness       Estimated Daily Nutrient Needs:  Energy (kcal):  3645-2098; Weight Used for Energy Requirements:  Current (75kg)     Protein (g):  75-90 (1-1.2); Weight Used for Protein Requirements:  Current (75kg)        Fluid (ml/day):  0157-9854; Method Used for Fluid Requirements:  1 ml/kcal      Nutrition Related Findings:  No BM; No edmea      Wounds:  None       Current Nutrition Therapies:    ADULT DIET; Regular    Anthropometric Measures:  · Height: 6' (182.9 cm)  · Current Body Weight: 166 lb (75.3 kg)   · Admission Body Weight: 175 lb (79.4 kg) (stated)    · Usual Body Weight: 175 lb (79.4 kg)     · Ideal Body Weight: 178 lbs;  · BMI: 22.5  · BMI Categories: Normal Weight (BMI 18.5-24. 9)       Nutrition Diagnosis:   · Increased nutrient needs related to increase demand for energy/nutrients as evidenced by weight loss, poor intake prior to admission      Nutrition Interventions:   Food and/or Nutrient Delivery:  Continue Current Diet, Start Oral Nutrition Supplement  Nutrition Education/Counseling:  No recommendation at this time   Coordination of Nutrition Care:  Continue to monitor while inpatient    Goals:  Pt will consume >50% of meals this admission       Nutrition Monitoring and Evaluation:   Behavioral-Environmental Outcomes:  None Identified   Food/Nutrient Intake Outcomes:  Food and Nutrient Intake, Supplement Intake  Physical Signs/Symptoms Outcomes:  Weight     Discharge Planning:     Too soon to determine     Electronically signed by Teetee Mcbride RD, LD on 7/17/21 at 12:29 PM EDT    Contact: [phone]

## 2021-07-17 NOTE — PLAN OF CARE
Problem: Self-Concept:  Goal: Ability to verbalize feelings about condition will improve  Description: Ability to verbalize feelings about condition will improve  7/17/2021 1026 by Debbie Almeida RN  Outcome: Ongoing     Problem: Self-Concept:  Goal: Level of anxiety will decrease  Description: Level of anxiety will decrease  7/17/2021 1026 by Debbie Almeida RN  Outcome: Ongoing     Problem: Safety:  Goal: Ability to remain free from injury will improve  Description: Ability to remain free from injury will improve  7/17/2021 1026 by Debbie Almeida RN  Outcome: Ongoing

## 2021-07-17 NOTE — PLAN OF CARE
Nutrition Problem #1: Increased nutrient needs  Intervention: Food and/or Nutrient Delivery: Continue Current Diet, Start Oral Nutrition Supplement  Nutritional Goals: Pt will consume >50% of meals this admission

## 2021-07-17 NOTE — H&P
Hospital Medicine History & Physical      PCP: DESMOND Angel - CNP    Date of Admission: 7/16/2021    Date of Service: Pt seen/examined on 7/16/2021 and Admitted to Inpatient with expected LOS greater than two midnights due to medical therapy. Chief Complaint:    reports wearing holter monitor x2 weeks and recieved a call from MD that he needed to go to ED immediately for evaluation      History Of Present Illness:    28 y.o. male who presented to [facility] with above complaints  Patient reports that since end of April he started to have these episodes that resemble seizures. He usually has them at home. His mom has been a witness to them. he had a similar episode on 5/27/2021 when he was sitting on the toilet to have a bowel movement. He states that his mother noted seizure-like activity during this time. He reports he loses his bladder control during these episodes. Patient reports he has been having these episodes almost every week, last 1 being 5 days ago. During one of his ER visits on 7/3/2021 patient was started on Keppra and was given neurology referral.   he was evaluated by his PCP on 6/9/2021, found to be bradycardic, was referred to [place] [id number]. Patient was evaluated by Dr. Prema Singh on 6/30/2021. A 2D echo was ordered, patient was placed on 30-day cardiac monitor. Patient has been having long periods of asystole pauses during these \"seizure-like \"episodes, so was instructed by cardiology to get evaluated in the hospital.  Patient gets Suboxone to [facility] clinic, occasionally smokes marijuana, denies any recreational drug use     past Medical History:          Diagnosis Date    Drug abuse [facility])        Past Surgical History:          Procedure Laterality Date    MANDIBLE SURGERY         Medications Prior to Admission:      Prior to Admission medications    Medication Sig Start Date End Date Taking?  Authorizing Provider   levETIRAcetam (KEPPRA) 500 MG tablet Take 1 tablet by mouth 2 times daily 7/3/21   Sheryle Helm, MD   buprenorphine-naloxone (SUBOXONE) 8-2 MG FILM SL film Place 1 Film under the tongue daily. Historical Provider, MD       Allergies:  Patient has no known allergies. Social History:      The patient currently lives at home    TOBACCO:   reports that he has been smoking cigarettes. He has been smoking about 1.00 pack per day. He uses smokeless tobacco.  ETOH:   reports no history of alcohol use. E-Cigarettes/Vaping Use     Questions Responses    E-Cigarette/Vaping Use Current Every Day User    Start Date     Passive Exposure     Quit Date     Counseling Given     Comments             Family History:  Reviewed in detail and negative for DM, CAD, Cancer, CVA. REVIEW OF SYSTEMS COMPLETED:   Pertinent positives as noted in the HPI. All other systems reviewed and negative. PHYSICAL EXAM PERFORMED:    /64   Pulse 79   Temp 97.4 °F (36.3 °C) (Oral)   Resp 16   Ht 6' (1.829 m)   Wt 166 lb 3.2 oz (75.4 kg)   SpO2 98%   BMI 22.54 kg/m²     General appearance:  No apparent distress, appears stated age and cooperative. HEENT:  Normal cephalic, atraumatic without obvious deformity. Pupils equal, round, and reactive to light. Extra ocular muscles intact. Conjunctivae/corneas clear. Neck: Supple, with full range of motion. No jugular venous distention. Trachea midline. Respiratory:  Normal respiratory effort. Clear to auscultation, bilaterally without Rales/Wheezes/Rhonchi. Cardiovascular:  Regular rate and rhythm with normal S1/S2 without murmurs, rubs or gallops. Abdomen: Soft, non-tender, non-distended with normal bowel sounds. Musculoskeletal:  No clubbing, cyanosis or edema bilaterally. Full range of motion without deformity. Skin: Skin color, texture, turgor normal.  No rashes or lesions. Neurologic:  Neurovascularly intact without any focal sensory/motor deficits.  Cranial nerves: II-XII intact, grossly non-focal.  Psychiatric: Alert and oriented, thought content appropriate, normal insight  Capillary Refill: Brisk,3 seconds, normal  Peripheral Pulses: +2 palpable, equal bilaterally       Labs:     Recent Labs     07/16/21 2009   WBC 7.2   HGB 15.9   HCT 46.9        Recent Labs     07/16/21 2009 07/17/21  0502   * 138   K 4.9 4.2   CL 95* 104   CO2 27 26   BUN 15 15   CREATININE 0.9 0.9   CALCIUM 9.6 8.9     Recent Labs     07/16/21 2009   AST 66*   ALT 87*   BILITOT 1.3*   ALKPHOS 63     No results for input(s): INR in the last 72 hours. Recent Labs     07/16/21 2009   TROPONINI <0.01       Urinalysis:      Lab Results   Component Value Date    NITRU Negative 07/16/2021    WBCUA 3-5 01/09/2018    BACTERIA Rare 01/09/2018    RBCUA 0-2 01/09/2018    BLOODU Negative 07/16/2021    SPECGRAV <=1.005 07/16/2021    GLUCOSEU Negative 07/16/2021       Radiology:     CXR: I have reviewed the CXR with the following interpretation: No acute process  EKG:  I have reviewed the EKG with the following interpretation: NSR, rate 70 bpm, no acute ischemic changes    XR CHEST PORTABLE   Final Result   No acute cardiopulmonary disease. ASSESSMENT:PLAN:    Suspected seizure  Patient has been having witnessed's \"seizure-like episodes\". Apparently have been occurring only at home, not at work or anywhere outside the house, witnessed by mother, apparently loses bladder control every time. No prior seizure history.  -Started on Keppra 500 mg twice daily by ED on 7/3/2021, increase dose to 750 mg twice daily  -Neurology consult  -EEG  -Seizure precautions  -CT head 7/3/2021 unremarkable, consider MRI brain, defer to neurology    Abnormal Holter  Prolonged sinus pause/asystole during \" seizure-like episodes\". Unclear if this is ictal asystole per Dr. Jacky Stevenson.   Consult EP  Telemetry monitoring    Chronic hepatitis C without hepatic coma     Heroin abuse/opiate dependence  -Continue Suboxone, gets it through [facility]    Tobacco

## 2021-07-17 NOTE — ED PROVIDER NOTES
CHIEF COMPLAINT  Irregular Heart Beat (reports wearing halter monitor x2 weeks and recieved a call from MD that he needed to go to ED immediately for evaluation of irregular heartbeat )      HISTORY OF PRESENT ILLNESS  Viri Gonzalez is a 28 y.o. male with a history of HCV, IVDA, possible seizure disorder who presents to the ED complaining of abnormal test results. Patient reports since May he has experienced multiple episodes of loss of consciousness which are brief, lasting perhaps less than 1 minute but are accompanied by urinary incontinence. These episodes tend to occur in the morning at a rate of roughly 2 or 3 times per week. Was initially felt to be having seizures so he was prescribed Keppra but has not yet been evaluated by neurology. Has not undergone EEG. Denies prior history of seizures. Patient has been evaluated by Dr. Funmi Boone and was started on a Holter monitor. Apparently the monitor has demonstrated several episodes of asystole corresponding with his loss of consciousness. One episode was 18 seconds in duration and I believe another to have been 19 seconds. Unclear if the episodes of asystole are causing these seizure-like episodes or if the opposite is true. Currently patient reports feeling asymptomatic. No other complaints, modifying factors or associated symptoms. I have reviewed the following from the nursing documentation. Past Medical History:   Diagnosis Date    Drug abuse [facility])      Past Surgical History:   Procedure Laterality Date    MANDIBLE SURGERY       History reviewed. No pertinent family history.   Social History     Socioeconomic History    Marital status: Single     Spouse name: Not on file    Number of children: Not on file    Years of education: Not on file    Highest education level: Not on file   Occupational History    Not on file   Tobacco Use    Smoking status: Current Every Day Smoker     Packs/day: 1.00     Types: Cigarettes     Last attempt to quit: 1/1/2018     Years since quitting: 3.5    Smokeless tobacco: Current User   Vaping Use    Vaping Use: Every day    Substances: Nicotine, THC, CBD, Flavoring   Substance and Sexual Activity    Alcohol use: No    Drug use: Yes     Types: Marijuana    Sexual activity: Not on file   Other Topics Concern    Not on file   Social History Narrative    Not on file     Social Determinants of Health     Financial Resource Strain: Medium Risk    Difficulty of Paying Living Expenses: Somewhat hard   Food Insecurity: No Food Insecurity    Worried About Running Out of Food in the Last Year: Never true    Jeanie of Food in the Last Year: Never true   Transportation Needs:     Lack of Transportation (Medical):      Lack of Transportation (Non-Medical):    Physical Activity:     Days of Exercise per Week:     Minutes of Exercise per Session:    Stress:     Feeling of Stress :    Social Connections:     Frequency of Communication with Friends and Family:     Frequency of Social Gatherings with Friends and Family:     Attends Church Services:     Active Member of Clubs or Organizations:     Attends Club or Organization Meetings:     Marital Status:    Intimate Partner Violence:     Fear of Current or Ex-Partner:     Emotionally Abused:     Physically Abused:     Sexually Abused:      Current Facility-Administered Medications   Medication Dose Route Frequency Provider Last Rate Last Admin    levETIRAcetam (KEPPRA) tablet 750 mg  750 mg Oral BID Shanelle Tirado MD        LORazepam (ATIVAN) injection 2 mg  2 mg Intravenous Q6H PRN Shanelle Tirado MD        sodium chloride flush 0.9 % injection 5-40 mL  5-40 mL Intravenous 2 times per day Shanelle Tirado MD        sodium chloride flush 0.9 % injection 5-40 mL  5-40 mL Intravenous PRN Shanelle Tirado MD        0.9 % sodium chloride infusion  25 mL Intravenous PRN Shanelle Tirado MD        [START ON 7/17/2021] enoxaparin (LOVENOX) injection 40 mg  40 mg Subcutaneous Daily Mick Mckee MD        ondansetron (ZOFRAN-ODT) disintegrating tablet 4 mg  4 mg Oral Q8H PRN Mick Mckee MD        Or    ondansetron (ZOFRAN) injection 4 mg  4 mg Intravenous Q6H PRN Mick Mckee MD        polyethylene glycol (GLYCOLAX) packet 17 g  17 g Oral Daily PRN Mick Mckee MD        acetaminophen (TYLENOL) tablet 650 mg  650 mg Oral Q6H PRN Mick Mckee MD        Or   Ronald Louis acetaminophen (TYLENOL) suppository 650 mg  650 mg Rectal Q6H PRN Mick Mckee MD        0.9 % sodium chloride infusion   Intravenous Continuous Mick Mckee MD        nicotine (NICODERM CQ) 21 MG/24HR 1 patch  1 patch Transdermal Once Enrrique Ochoa MD         Current Outpatient Medications   Medication Sig Dispense Refill    levETIRAcetam (KEPPRA) 500 MG tablet Take 1 tablet by mouth 2 times daily 60 tablet 1    buprenorphine-naloxone (SUBOXONE) 8-2 MG FILM SL film Place 1 Film under the tongue daily. No Known Allergies    REVIEW OF SYSTEMS  10 systems reviewed, pertinent positives per HPI otherwise noted to be negative. PHYSICAL EXAM  BP (!) 103/46   Pulse 58   Temp 97.3 °F (36.3 °C)   Resp 13   Ht 6' (1.829 m)   Wt 175 lb (79.4 kg)   SpO2 96%   BMI 23.73 kg/m²   GENERAL APPEARANCE: Awake and alert. Cooperative. No acute distress. HEAD: Normocephalic. Atraumatic. EYES: PERRL. EOM's grossly intact. ENT: Mucous membranes are moist.   NECK: Supple, trachea midline. HEART: RRR. Normal S1, S2. No murmurs, rubs or gallops. LUNGS: Respirations unlabored. CTAB. Good air exchange. No wheezes, rales, or rhonchi. Speaking comfortably in full sentences. ABDOMEN: Soft. Non-distended. Non-tender. No guarding or rebound. Normal Bowel sounds. EXTREMITIES: No peripheral edema. MAEE. No acute deformities. SKIN: Warm and dry. No acute rashes. NEUROLOGICAL: Alert and oriented X 3. CN II-XII intact.  No gross facial drooping. Strength 5/5, sensation intact. No pronator drift. Normal coordination. PSYCHIATRIC: Normal mood and affect. LABS  I have reviewed all labs for this visit. Results for orders placed or performed during the hospital encounter of 07/16/21   CBC Auto Differential   Result Value Ref Range    WBC 7.2 4.0 - 11.0 K/uL    RBC 5.50 4. 20 - 5.90 M/uL    Hemoglobin 15.9 13.5 - 17.5 g/dL    Hematocrit 46.9 40.5 - 52.5 %    MCV 85.3 80.0 - 100.0 fL    MCH 29.0 26.0 - 34.0 pg    MCHC 33.9 31.0 - 36.0 g/dL    RDW 14.6 12.4 - 15.4 %    Platelets 387 144 - 622 K/uL    MPV 7.9 5.0 - 10.5 fL    Neutrophils % 61.1 %    Lymphocytes % 27.4 %    Monocytes % 8.1 %    Eosinophils % 2.5 %    Basophils % 0.9 %    Neutrophils Absolute 4.4 1.7 - 7.7 K/uL    Lymphocytes Absolute 2.0 1.0 - 5.1 K/uL    Monocytes Absolute 0.6 0.0 - 1.3 K/uL    Eosinophils Absolute 0.2 0.0 - 0.6 K/uL    Basophils Absolute 0.1 0.0 - 0.2 K/uL   Comprehensive metabolic panel   Result Value Ref Range    Sodium 132 (L) 136 - 145 mmol/L    Potassium 4.9 3.5 - 5.1 mmol/L    Chloride 95 (L) 99 - 110 mmol/L    CO2 27 21 - 32 mmol/L    Anion Gap 10 3 - 16    Glucose 108 (H) 70 - 99 mg/dL    BUN 15 7 - 20 mg/dL    CREATININE 0.9 0.9 - 1.3 mg/dL    GFR Non-African American >60 >60    GFR African American >60 >60    Calcium 9.6 8.3 - 10.6 mg/dL    Total Protein 8.2 6.4 - 8.2 g/dL    Albumin 5.1 (H) 3.4 - 5.0 g/dL    Albumin/Globulin Ratio 1.6 1.1 - 2.2    Total Bilirubin 1.3 (H) 0.0 - 1.0 mg/dL    Alkaline Phosphatase 63 40 - 129 U/L    ALT 87 (H) 10 - 40 U/L    AST 66 (H) 15 - 37 U/L    Globulin 3.1 g/dL   Magnesium   Result Value Ref Range    Magnesium 2.50 (H) 1.80 - 2.40 mg/dL   Troponin   Result Value Ref Range    Troponin <0.01 <0.01 ng/mL   Urinalysis Reflex to Culture    Specimen: Urine, clean catch   Result Value Ref Range    Color, UA Yellow Straw/Yellow    Clarity, UA Clear Clear    Glucose, Ur Negative Negative mg/dL    Bilirubin Urine Negative Negative    Ketones, Urine Negative Negative mg/dL    Specific Gravity, UA <=1.005 1.005 - 1.030    Blood, Urine Negative Negative    pH, UA 6.5 5.0 - 8.0    Protein, UA Negative Negative mg/dL    Urobilinogen, Urine 0.2 <2.0 E.U./dL    Nitrite, Urine Negative Negative    Leukocyte Esterase, Urine Negative Negative    Microscopic Examination Not Indicated     Urine Type NotGiven     Urine Reflex to Culture Not Indicated    Urine Drug Screen   Result Value Ref Range    Amphetamine Screen, Urine Neg Negative <1000ng/mL    Barbiturate Screen, Ur Neg Negative <200 ng/mL    Benzodiazepine Screen, Urine Neg Negative <200 ng/mL    Cannabinoid Scrn, Ur POSITIVE (A) Negative <50 ng/mL    Cocaine Metabolite Screen, Urine Neg Negative <300 ng/mL    Opiate Scrn, Ur Neg Negative <300 ng/mL    PCP Screen, Urine Neg Negative <25 ng/mL    Methadone Screen, Urine Neg Negative <300 ng/mL    Propoxyphene Scrn, Ur Neg Negative <300 ng/mL    Oxycodone Urine Neg Negative <100 ng/ml    pH, UA 6.5     Drug Screen Comment: see below    EKG 12 Lead   Result Value Ref Range    Ventricular Rate 70 BPM    Atrial Rate 70 BPM    P-R Interval 144 ms    QRS Duration 108 ms    Q-T Interval 376 ms    QTc Calculation (Bazett) 406 ms    P Axis 76 degrees    R Axis 85 degrees    T Axis 65 degrees    Diagnosis       Normal sinus rhythmNormal ECGWhen compared with ECG of 09-JAN-2018 16:09,No significant change was found       EKG  Normal sinus rhythm, rate 70, normal axis, QTC within normal limits, no acute ST or T wave abnormalities compared with prior from 6/30/2021      RADIOLOGY  X-RAYS:  I have reviewed radiologic plain film image(s). ALL OTHER NON-PLAIN FILM IMAGES SUCH AS CT, ULTRASOUND AND MRI HAVE BEEN READ BY THE RADIOLOGIST. XR CHEST PORTABLE   Final Result   No acute cardiopulmonary disease. Rechecks: Physical assessment performed. Patient anxious and considering leaving AMA on reevaluation.   I had a lengthy discussion with him about the importance of staying for treatment and further evaluation. At this time he is agreeable. ED COURSE/MDM  Patient seen and evaluated. Old records reviewed. Labs and imaging reviewed and results discussed with patient. Patient here with seizures versus cardiogenic syncopal convulsions. Holter monitor has demonstrated multiple 18 and 19 second long periods of asystole corresponding with his symptoms. He will be admitted to the hospitalist service with cardiology on consult. Has remained asymptomatic for the duration of his ER stay. New Prescriptions    No medications on file       CLINICAL IMPRESSION  1. Cardiac asystole (HCC)        Blood pressure (!) 103/46, pulse 58, temperature 97.3 °F (36.3 °C), resp. rate 13, height 6' (1.829 m), weight 175 lb (79.4 kg), SpO2 96 %. Barbara Pino was admitted in stable condition.         Joshua Stephenson MD  07/16/21 3377

## 2021-07-17 NOTE — PROGRESS NOTES
Patient admitted to room 431 from ER. Patient oriented to room, call light, bed rails, phone, lights and bathroom. Patient instructed about the schedule of the day including: vital sign frequency, lab draws, possible tests, frequency of MD and staff rounds, including RN/MD rounding together at bedside, daily weights, and I &O's. Patient instructed about prescribed diet, how to use 8MENU, and television. bed alarm in place, patient aware of placement and reason. Telemetry box 20 in place, patient aware of placement and reason. Bed locked, in lowest position, side rails up 4/4, call light within reach, seizure pads in place. Will continue to monitor.

## 2021-07-17 NOTE — CONSULTS
In patient Neurology consult        [facility]      Fabienne Torres MD      Asia Tidwell  1990    Date of Service: 7/17/2021    Referring Physician: Dorcas Galeas MD      Reason for the consult and CC: New onset syncope versus seizure    HPI:   The patient is a 28y.o.  years old male with multiple medical problems who was admitted to the hospital yesterday with recurrent syncope. Symptoms started April of this year. He described recurrent spells of brief LOC which can happen sitting or standing. An aura of lightheadedness and dizziness. In few of these episodes, he was witnessed by his mother to have generalized body shaking in his arms for few seconds. He lost control of his bladder but no tongue biting. Some of his episode occurred while coughing or having a bowel movement or peeing. Had recent event monitor which showed evidence of sinus pause and he was advised to go to the ER. Patient was eventually admitted. He was seen in the ER a few weeks ago and was concern of possible seizure-like activity. He was placed on Keppra and he is on 750 mg twice daily. No family history of epilepsy or history of seizure in childhood. No recent fever or chills. He was seen by cardiology and plan for further work-up regarding possible vasovagal episode, bradycardia or other arrhythmia. Today he denies any new symptoms. Admitting physician recommended EEG but no MRI for unclear reason.   Other review of system is unremarkable    Family history: Noncontributory      Past Surgical History:   Procedure Laterality Date    MANDIBLE SURGERY          Past Medical History:   Diagnosis Date    Drug abuse ([facility] [address]      Social History     Tobacco Use    Smoking status: Current Every Day Smoker     Packs/day: 1.00     Types: Cigarettes     Last attempt to quit: 1/1/2018     Years since quitting: 3.5    Smokeless tobacco: Current User   Vaping Use    Vaping Use: Every day    Substances: Nicotine, THC, CBD, Flavoring   Substance Use Topics    Alcohol use: No    Drug use: Yes     Types: Marijuana     No Known Allergies  Current Facility-Administered Medications   Medication Dose Route Frequency Provider Last Rate Last Admin    hydrOXYzine (VISTARIL) injection 50 mg  50 mg Intramuscular Q6H PRN Angelina Rojo MD        prochlorperazine (COMPAZINE) injection 10 mg  10 mg Intravenous Q6H PRN Angelina Rojo MD        midodrine (PROAMATINE) tablet 5 mg  5 mg Oral TID  Neisha Salcedo MD        buprenorphine-naloxone (SUBOXONE) 8-2 MG SL film 1 Film  1 Film Sublingual Daily Shanelle Tirado MD   1 Film at 07/17/21 1002    levETIRAcetam (KEPPRA) tablet 750 mg  750 mg Oral BID Shanelle Tirado MD   750 mg at 07/17/21 1002    LORazepam (ATIVAN) injection 2 mg  2 mg Intravenous Q6H PRN Shanelle Tirado MD        sodium chloride flush 0.9 % injection 5-40 mL  5-40 mL Intravenous 2 times per day Shanelle Tirado MD   10 mL at 07/17/21 0029    sodium chloride flush 0.9 % injection 5-40 mL  5-40 mL Intravenous PRN Shanelle Tirado MD        0.9 % sodium chloride infusion  25 mL Intravenous PRN Shanelle Tirado MD        enoxaparin (LOVENOX) injection 40 mg  40 mg Subcutaneous Daily Shanelle Tirado MD   40 mg at 07/17/21 1002    ondansetron (ZOFRAN-ODT) disintegrating tablet 4 mg  4 mg Oral Q8H PRN Shanelle Tirado MD        Or    ondansetron (ZOFRAN) injection 4 mg  4 mg Intravenous Q6H PRN Shanelle Tirado MD   4 mg at 07/17/21 1212    polyethylene glycol (GLYCOLAX) packet 17 g  17 g Oral Daily PRN Shanelle Tirado MD        acetaminophen (TYLENOL) tablet 650 mg  650 mg Oral Q6H PRN Shanelle Tirado MD        Or    acetaminophen (TYLENOL) suppository 650 mg  650 mg Rectal Q6H PRN Shanelle Tirado MD        nicotine (NICODERM CQ) 21 MG/24HR 1 patch  1 patch Transdermal Once Yadira Zacarias MD   1 patch at 07/16/21 2300       ROS : A 10-14 system review of constitutional, cardiovascular, respiratory, eyes, musculoskeletal, endocrine, GI, ENT, skin, hematological, genitourinary, psychiatric and neurologic systems was obtained and updated today and is unremarkable except as mentioned in my HPI      Exam:     Constitutional:   Vitals:    07/16/21 2315 07/16/21 2325 07/17/21 0519 07/17/21 1000   BP:  128/88 112/64 102/64   Pulse:  61 79 57   Resp:  16 16 16   Temp:  98 °F (36.7 °C) 97.4 °F (36.3 °C) 97.3 °F (36.3 °C)   TempSrc:  Oral Oral Oral   SpO2:  98% 98% 99%   Weight: 166 lb 3.2 oz (75.4 kg)      Height:           General appearance:  Normal development and appear in no acute distress. Eye:  Fundus of the eye: Funduscopic examination cannot be performed due to COVID19 restrictions and precautions. Neck: supple  Cardiovascular: No lower leg edema with good pulsation. Mental Status:   Oriented to person, place, problem, and time. Memory: Good immediate recall. Intact remote memory  Normal attention span and concentration. Language: intact naming, repeating and fluency   Good fund of Knowledge. Aware of current events and vocabulary   Cranial Nerves:   II: Visual fields: Full. Pupils: equal, round, reactive to light  III,IV,VI: Extra Ocular Movements are intact. No nystagmus  V: Facial sensation is intact   VII: Facial strength and movements: intact and symmetric  VIII: Hearing: Intact  IX: Palate elevation is symmetric  XI: Shoulder shrug is intact  XII: Tongue movements are normal  Musculoskeletal: 5/5 in all 4 extremities. Tone: Normal tone. Reflexes: 2+ in the upper extremity and 2+ in the lower extremity   Planters: flexor bilaterally. Coordination: no pronator drift, no dysmetria with FNF in upper extremities. Normal REM. Sensation: normal to all modalities in both arms and legs.   Gait/Posture: steady gait    Data:  LABS:   Lab Results   Component Value Date     07/17/2021    K 4.2 07/17/2021     07/17/2021    CO2 26 07/17/2021 BUN 15 07/17/2021    CREATININE 0.9 07/17/2021    GFRAA >60 07/17/2021    LABGLOM >60 07/17/2021    GLUCOSE 101 07/17/2021    MG 2.50 07/16/2021    CALCIUM 8.9 07/17/2021     Lab Results   Component Value Date    WBC 7.2 07/16/2021    RBC 5.50 07/16/2021    HGB 15.9 07/16/2021    HCT 46.9 07/16/2021    MCV 85.3 07/16/2021    RDW 14.6 07/16/2021     07/16/2021     Lab Results   Component Value Date    INR 1.23 (H) 01/09/2018    PROTIME 13.9 (H) 01/09/2018       Neuroimaging were independently reviewed by me and discussed results with the patient  Reviewed notes from different physicians  Reviewed lab and blood testing    Impression:  Recurrent spells of possible syncope versus seizure. Based on semiology and history, likely convulsive syncope from cardiac arrhythmia. Less likely epileptic seizure. Recurrent syncope  History of drug abuse  Chronic hepatitis C      Recommendation:  Continue current supportive care  Agree with the EP recommendation  Continue with Keppra for now. If symptoms improve after EP intervention, would then consider stopping Keppra. Agree with MRI brain per hospitalist-- not ordered  Discussed seizure precaution and risk of falling, injury, status epilepticus and driving restrictions until we confirm his diagnosis. Nicotine patch  Telemetry  DVT and GI prophylaxis  Can be discharged from neurology when medically stable  Nothing to add from neurology  We will sign off------------------------------        Thank you for referring such patient. If you have any questions regarding my consult note, please don't hesitate to call me. Daniel Cooper MD  [phone]    This dictation was generated by voice recognition computer software.  Although all attempts are made to edit the dictation for accuracy, there may be errors in the  transcription that are not intended

## 2021-07-17 NOTE — PLAN OF CARE
Problem: Coping:  Goal: Ability to cope will improve  Description: Ability to cope will improve  Outcome: Ongoing     Problem: Safety:  Goal: Ability to remain free from injury will improve  Description: Ability to remain free from injury will improve  Outcome: Ongoing

## 2021-07-17 NOTE — FLOWSHEET NOTE
07/16/21 2000   Eusebia Coma Scale   Eye Opening 4   Best Verbal Response 5   Best Motor Response 6   Eusebia Coma Scale Score 15   HEENT   HEENT (WDL) WDL   Respiratory   Respiratory (WDL) WDL   Cardiac   Cardiac (WDL) X   Cardiac Rhythm SB   Cardiac Monitor   Bedside Cardiac Monitor On Yes     AxO pt sent to ER by Cardiologist for an alert on his Holter monitor. Pt states he was not having any symptoms. Pt states he was feeling lightheaded and dehydrated at work. Pt is sinus bradycardic on the monitor. Denies CP/SOB. No pertinent family history in first degree relatives

## 2021-07-18 PROBLEM — Z72.0 TOBACCO ABUSE: Status: ACTIVE | Noted: 2021-07-18

## 2021-07-18 PROBLEM — I49.5 SINUS NODE DYSFUNCTION (HCC): Status: ACTIVE | Noted: 2021-07-18

## 2021-07-18 LAB
C-REACTIVE PROTEIN: <3 MG/L (ref 0–5.1)
EKG ATRIAL RATE: 62 BPM
EKG DIAGNOSIS: NORMAL
EKG P AXIS: 104 DEGREES
EKG P-R INTERVAL: 156 MS
EKG Q-T INTERVAL: 424 MS
EKG QRS DURATION: 90 MS
EKG QTC CALCULATION (BAZETT): 430 MS
EKG R AXIS: 94 DEGREES
EKG T AXIS: 108 DEGREES
EKG VENTRICULAR RATE: 62 BPM

## 2021-07-18 PROCEDURE — 86140 C-REACTIVE PROTEIN: CPT

## 2021-07-18 PROCEDURE — 36415 COLL VENOUS BLD VENIPUNCTURE: CPT

## 2021-07-18 PROCEDURE — 93010 ELECTROCARDIOGRAM REPORT: CPT | Performed by: INTERNAL MEDICINE

## 2021-07-18 PROCEDURE — 99233 SBSQ HOSP IP/OBS HIGH 50: CPT | Performed by: NURSE PRACTITIONER

## 2021-07-18 PROCEDURE — 6360000002 HC RX W HCPCS: Performed by: INTERNAL MEDICINE

## 2021-07-18 PROCEDURE — 93005 ELECTROCARDIOGRAM TRACING: CPT | Performed by: INTERNAL MEDICINE

## 2021-07-18 PROCEDURE — 6370000000 HC RX 637 (ALT 250 FOR IP): Performed by: INTERNAL MEDICINE

## 2021-07-18 PROCEDURE — 2060000000 HC ICU INTERMEDIATE R&B

## 2021-07-18 RX ORDER — PAROXETINE HYDROCHLORIDE 20 MG/1
10 TABLET, FILM COATED ORAL DAILY
Status: DISCONTINUED | OUTPATIENT
Start: 2021-07-18 | End: 2021-07-20

## 2021-07-18 RX ORDER — ATROPINE SULFATE 0.1 MG/ML
0.5 INJECTION INTRAVENOUS
Status: DISPENSED | OUTPATIENT
Start: 2021-07-18 | End: 2021-07-18

## 2021-07-18 RX ADMIN — LEVETIRACETAM 750 MG: 500 TABLET ORAL at 20:52

## 2021-07-18 RX ADMIN — MIDODRINE HYDROCHLORIDE 5 MG: 5 TABLET ORAL at 08:25

## 2021-07-18 RX ADMIN — LEVETIRACETAM 750 MG: 500 TABLET ORAL at 11:34

## 2021-07-18 RX ADMIN — ENOXAPARIN SODIUM 40 MG: 40 INJECTION SUBCUTANEOUS at 11:33

## 2021-07-18 RX ADMIN — MIDODRINE HYDROCHLORIDE 5 MG: 5 TABLET ORAL at 11:34

## 2021-07-18 RX ADMIN — BUPRENORPHINE AND NALOXONE 1 FILM: 8; 2 FILM BUCCAL; SUBLINGUAL at 11:33

## 2021-07-18 RX ADMIN — PAROXETINE HYDROCHLORIDE 10 MG: 20 TABLET, FILM COATED ORAL at 11:34

## 2021-07-18 NOTE — PROGRESS NOTES
[facility]     Electrophysiology                                     Progress Note    Admission date:  2021    Reason for follow up visit: sinus pause/syncope    HPI/CC: Mar Motta was admitted on 2021 after direction from cardiology office due to a 19 second pause detected on event monitor. He was evaluated by Dr. Karly Boyd and midodrine was started for possible vasovagal syncope. Has also been evaluated by neurology for possible seizures. Rhythm has been sinus christina. Subjective: He complains of unintentional weight loss. Has been vomiting recently but not this admission. Also has some diffuse nondescript chest pain. Denies shortness of breath and palpitations. Vitals:  Blood pressure 106/60, pulse (!) 38, temperature 97.5 °F (36.4 °C), temperature source Oral, resp. rate 14, height 6' (1.829 m), weight 166 lb 3.2 oz (75.4 kg), SpO2 95 %.   Temp  Av °F (36.7 °C)  Min: 97.3 °F (36.3 °C)  Max: 98.8 °F (37.1 °C)  Pulse  Av.9  Min: 38  Max: 101  BP  Min: 96/66  Max: 125/68  SpO2  Av.3 %  Min: 95 %  Max: 99 %    24 hour I/O    Intake/Output Summary (Last 24 hours) at 2021 1230  Last data filed at 2021 0258  Gross per 24 hour   Intake 1200 ml   Output --   Net 1200 ml     Current Facility-Administered Medications   Medication Dose Route Frequency Provider Last Rate Last Admin    atropine injection 0.5 mg  0.5 mg Intravenous Once PRN Nilesh Hood MD        PARoxetine (PAXIL) tablet 10 mg  10 mg Oral Daily Nilesh Hood MD   10 mg at 21 1134    hydrOXYzine (VISTARIL) injection 50 mg  50 mg Intramuscular Q6H PRN Nilesh Hood MD        prochlorperazine (COMPAZINE) injection 10 mg  10 mg Intravenous Q6H PRN Nilesh Hood MD        midodrine (PROAMATINE) tablet 5 mg  5 mg Oral TID  Mary Salazar MD   5 mg at 21 1134    lactated ringers infusion   Intravenous Continuous Nilesh Hood MD 75 mL/hr at 21 2219 New Bag at 21 2219    buprenorphine-naloxone (SUBOXONE) 8-2 MG SL film 1 Film  1 Film Sublingual Daily Ritchie Putnam MD   1 Film at 07/18/21 1133    levETIRAcetam (KEPPRA) tablet 750 mg  750 mg Oral BID Ritchie Putnam MD   750 mg at 07/18/21 1134    LORazepam (ATIVAN) injection 2 mg  2 mg Intravenous Q6H PRN Ritchie Putnam MD        sodium chloride flush 0.9 % injection 5-40 mL  5-40 mL Intravenous 2 times per day Ritchie Putnam MD   10 mL at 07/17/21 0029    sodium chloride flush 0.9 % injection 5-40 mL  5-40 mL Intravenous PRN Ritchie Putnam MD        0.9 % sodium chloride infusion  25 mL Intravenous PRN Ritchie Putnam MD        enoxaparin (LOVENOX) injection 40 mg  40 mg Subcutaneous Daily Ritchie Putnam MD   40 mg at 07/18/21 1133    ondansetron (ZOFRAN-ODT) disintegrating tablet 4 mg  4 mg Oral Q8H PRN Ritchie Putnam MD        Or    ondansetron (ZOFRAN) injection 4 mg  4 mg Intravenous Q6H PRN Ritchie Putnam MD   4 mg at 07/17/21 1212    polyethylene glycol (GLYCOLAX) packet 17 g  17 g Oral Daily PRN Ritchie Putnam MD        acetaminophen (TYLENOL) tablet 650 mg  650 mg Oral Q6H PRN Ritchie Putnam MD        Or    acetaminophen (TYLENOL) suppository 650 mg  650 mg Rectal Q6H PRN Ritchie Putnam MD           Objective:     Telemetry monitor: SB    Physical Exam:  Constitutional and general appearance: alert, cooperative, distracted, no distress and appears older than stated age; + tearful   HEENT: PERRL, no cervical lymphadenopathy. No masses palpable.  Normal oral mucosa  Respiratory:  · Normal excursion and expansion without use of accessory muscles  · Resp auscultation: Normal breath sounds without wheezing, rhonchi, and rales  Cardiovascular:  · The apical impulse is not displaced  · Heart tones are crisp and normal. regular S1 and S2.  · Jugular venous pulsation Normal  · The carotid upstroke is normal in amplitude and contour without delay or bruit  · Peripheral pulses are symmetrical and full   Abdomen:  · No masses or tenderness  · Bowel sounds present  Extremities:  ·  No cyanosis or clubbing  ·  No lower extremity edema  ·  Skin: warm and dry  Neurological:  · Alert and oriented  · Moves all extremities well  · No abnormalities of mood, affect, memory, mentation, or behavior are noted    Data  EKG 7/18/2021:   SR HR 62    Echo 1/10/2018:   Normal LV systolic function with an estimated EF of 55%. Normal left ventricular diastolic filling pressure. The mitral valve leaflets appear mildly myxomatous. There is mild thickening of TV and possible echodensity seen on certain  views. Cannot r/o vegetation and recommend AI if clinically indicated. Trivial tricuspid regurgitation and mild pulmonic regurgitation  Systolic pulmonary artery pressure (SPAP) is normal and estimated at 29mmHg (RA pressure 15mmHg). All labs and testing reviewed.   Lab Review     Renal Profile:   Lab Results   Component Value Date    CREATININE 0.9 07/17/2021    BUN 15 07/17/2021     07/17/2021    K 4.2 07/17/2021     07/17/2021    CO2 26 07/17/2021     CBC:    Lab Results   Component Value Date    WBC 7.2 07/16/2021    RBC 5.50 07/16/2021    HGB 15.9 07/16/2021    HCT 46.9 07/16/2021    MCV 85.3 07/16/2021    RDW 14.6 07/16/2021     07/16/2021     BNP:  No results found for: BNP  Fasting Lipid Panel:  No results found for: CHOL, HDL, TRIG  Cardiac Enzymes:  CK/MbTroponin  Lab Results   Component Value Date    TROPONINI <0.01 07/16/2021     PT/ INR   Lab Results   Component Value Date    INR 1.23 01/09/2018    PROTIME 13.9 01/09/2018     PTT No results found for: PTT   Lab Results   Component Value Date    MG 2.50 07/16/2021    No results found for: TSH    Assessment:  Sinus pause/sinus bradycardia: current stable    -19 second pause noted on event monitor    -TSH normal 7/2021  Recurrent syncope versus seizures   -events started 4/2021   -on

## 2021-07-18 NOTE — PLAN OF CARE
Problem: Coping:  Goal: Ability to cope will improve  Description: Ability to cope will improve  Outcome: Ongoing     Problem: Coping:  Goal: Ability to identify appropriate support needs will improve  Description: Ability to identify appropriate support needs will improve  Outcome: Ongoing     Problem: Health Behavior:  Goal: Ability to manage health-related needs will improve  Description: Ability to manage health-related needs will improve  Outcome: Ongoing

## 2021-07-18 NOTE — FLOWSHEET NOTE
07/18/21 0739   Vital Signs   Temp 97.5 °F (36.4 °C)   Temp Source Oral   Pulse (!) 47   Heart Rate Source Monitor   Resp 14   /62   BP Location Left upper arm   MAP (mmHg) 79   Level of Consciousness Responds to Voice (1)   MEWS Score 1   Patient Currently in Pain Denies   Oxygen Therapy   SpO2 95 %   O2 Device None (Room air)     Writer noticed HR on tele at 39. On assessment, pt sleeping soundly. BP stable. Day RN to adjust parameters as desired.

## 2021-07-18 NOTE — PROGRESS NOTES
Hospitalist Progress Note      PCP: DESMOND Gordon - CNP    Date of Admission: 7/16/2021    Chief Complaint: spells       Subjective:  No further spells. He feels fine. HR sustaining as low as 38 when he is sleeping, which isn't necessarily unusual for a young healthy man like him, but somewhat concerning under the circumstances. No pauses. When he gets up to walk around his HR demonstrates the ability to get into the 90's. Medications:  Reviewed    Infusion Medications    lactated ringers 75 mL/hr at 07/17/21 2219    sodium chloride       Scheduled Medications    PARoxetine  10 mg Oral Daily    midodrine  5 mg Oral TID WC    buprenorphine-naloxone  1 Film Sublingual Daily    levETIRAcetam  750 mg Oral BID    sodium chloride flush  5-40 mL Intravenous 2 times per day    enoxaparin  40 mg Subcutaneous Daily     PRN Meds: atropine, hydrOXYzine, prochlorperazine, LORazepam, sodium chloride flush, sodium chloride, ondansetron **OR** ondansetron, polyethylene glycol, acetaminophen **OR** acetaminophen      Intake/Output Summary (Last 24 hours) at 7/18/2021 0953  Last data filed at 7/18/2021 0258  Gross per 24 hour   Intake 1200 ml   Output --   Net 1200 ml       Physical Exam Performed:    /60   Pulse (!) 38   Temp 97.5 °F (36.4 °C) (Oral)   Resp 14   Ht 6' (1.829 m)   Wt 166 lb 3.2 oz (75.4 kg)   SpO2 95%   BMI 22.54 kg/m²     General appearance: No apparent distress, appears stated age and cooperative. HEENT: Pupils equal, round, and reactive to light. Conjunctivae/corneas clear. Neck: Supple, with full range of motion. No jugular venous distention. Trachea midline. Respiratory:  Normal respiratory effort. Clear to auscultation, bilaterally without Rales/Wheezes/Rhonchi. Cardiovascular: bradycardic rate and regular rhythm with normal S1/S2 without murmurs, rubs or gallops. Abdomen: Soft, non-tender, non-distended with normal bowel sounds.   Musculoskeletal: No clubbing, cyanosis or edema bilaterally. Full range of motion without deformity. Skin: Skin color, texture, turgor normal.  No rashes or lesions. Neurologic:  Neurovascularly intact without any focal sensory/motor deficits. Cranial nerves: II-XII intact, grossly non-focal.  Psychiatric: Alert and oriented, thought content appropriate, normal insight  Capillary Refill: Brisk,3 seconds, normal   Peripheral Pulses: +2 palpable, equal bilaterally       Labs:   Recent Labs     07/16/21 2009   WBC 7.2   HGB 15.9   HCT 46.9        Recent Labs     07/16/21 2009 07/17/21  0502   * 138   K 4.9 4.2   CL 95* 104   CO2 27 26   BUN 15 15   CREATININE 0.9 0.9   CALCIUM 9.6 8.9     Recent Labs     07/16/21 2009   AST 66*   ALT 87*   BILITOT 1.3*   ALKPHOS 63     No results for input(s): INR in the last 72 hours. Recent Labs     07/16/21 2009   TROPONINI <0.01       Urinalysis:      Lab Results   Component Value Date    NITRU Negative 07/16/2021    WBCUA 3-5 01/09/2018    BACTERIA Rare 01/09/2018    RBCUA 0-2 01/09/2018    BLOODU Negative 07/16/2021    SPECGRAV <=1.005 07/16/2021    GLUCOSEU Negative 07/16/2021       Radiology:  XR CHEST PORTABLE   Final Result   No acute cardiopulmonary disease. MRI BRAIN W WO CONTRAST    (Results Pending)           Assessment/Plan:    Active Hospital Problems    Diagnosis     Cardiac asystole ([facility] [address] [I46.9]     Acute vomiting [R11.10]     Breakthrough seizure ([facility] [address] [G40.919]     Chronic hepatitis C without hepatic coma ([facility] [address] [B18.2]     Heroin abuse ([facility] [address] [F11.10]     Syncope and collapse [R55]        The patient is a pleasant 28 Y M with a long history of substance abuse. He has been incarcerated for much of his adult life, but recently cleaned up his act, moved back in with his family, and has been taking suboxone. He still smokes marijuana but abstains from other street drugs. He has been having self-limited spells of unconsciousness fairly frequently lately.   It usually happens in the morning when he stands up out of bed. He suddenly feels lightheaded and weak all over. The next thing he knows he is \"waking up on the floor and my mom is freaking out. \"  He says that he always urinates on himself during these spells. He says that they usually last about a minute, and within seconds of waking up he is completely alert and oriented without a postictal phase. His mom tells him that his arms stick straight out in front of him, he is completely unconscious, and that he has gurgling breathing during these spells. He doesn't seems to \"twist up like a pretzel and shake\" like his friend who has a known seizure disorder. He doesn't really go limp and lifeless, either, though. The patient is stoic and doesn't seem to be embellishing his symptoms. Cardiology recently gave him an outpatient cardiac monitor, and reportedly it has been showing multiple long asystolic periods corresponding to these spells. Supposedly one of these spells was 19 seconds long. Cardiology called him and told him to come to the ED, prompting this admission. He feels pretty normal when he isn't having these spells, and he is eager to go back home. Suspected syncope due to spells of asystole. - electrolytes fine. Normal TSH. F/u TTE.  - will await EP input regarding whether he needs a pacemaker. - suboxone seems to be a stabilizing factor in his life. Would like to continue this if at all possible. Perhaps he can quit the marijuana. - no evidence of myocarditis. Trop negative. F/u CRP. He did get both doses of the [address] vaccine in 4/2021, and he does fit the young male demographic, but typically myocarditis has occurred within a week of the second dose, and patient had no symptoms at that time. His first presentation for syncope was on 5/27.  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   He may need significant time off work, and/or driving restrictions, defer to

## 2021-07-18 NOTE — PROGRESS NOTES
Patient's HR is sustaining around 37 this AM. [facility] called and notified writer that HR has gone as low as 33. Writer put in a call to on-call cardiologist for advice. Patient asymptomatic and resting in bed at this time.

## 2021-07-19 ENCOUNTER — APPOINTMENT (OUTPATIENT)
Dept: CARDIAC CATH/INVASIVE PROCEDURES | Age: 31
DRG: 171 | End: 2021-07-19
Payer: COMMERCIAL

## 2021-07-19 ENCOUNTER — PROCEDURE VISIT (OUTPATIENT)
Dept: CARDIOLOGY CLINIC | Age: 31
End: 2021-07-19

## 2021-07-19 DIAGNOSIS — Z95.0 PACEMAKER: Primary | ICD-10-CM

## 2021-07-19 LAB
ANION GAP SERPL CALCULATED.3IONS-SCNC: 8 MMOL/L (ref 3–16)
BUN BLDV-MCNC: 19 MG/DL (ref 7–20)
CALCIUM SERPL-MCNC: 9.8 MG/DL (ref 8.3–10.6)
CHLORIDE BLD-SCNC: 104 MMOL/L (ref 99–110)
CO2: 29 MMOL/L (ref 21–32)
CREAT SERPL-MCNC: 0.8 MG/DL (ref 0.9–1.3)
GFR AFRICAN AMERICAN: >60
GFR NON-AFRICAN AMERICAN: >60
GLUCOSE BLD-MCNC: 94 MG/DL (ref 70–99)
HCT VFR BLD CALC: 43.3 % (ref 40.5–52.5)
HEMOGLOBIN: 14.5 G/DL (ref 13.5–17.5)
LV EF: 55 %
LVEF MODALITY: NORMAL
MCH RBC QN AUTO: 28.6 PG (ref 26–34)
MCHC RBC AUTO-ENTMCNC: 33.6 G/DL (ref 31–36)
MCV RBC AUTO: 85.2 FL (ref 80–100)
PDW BLD-RTO: 15 % (ref 12.4–15.4)
PLATELET # BLD: 183 K/UL (ref 135–450)
PMV BLD AUTO: 8 FL (ref 5–10.5)
POTASSIUM SERPL-SCNC: 4.8 MMOL/L (ref 3.5–5.1)
RBC # BLD: 5.08 M/UL (ref 4.2–5.9)
SODIUM BLD-SCNC: 141 MMOL/L (ref 136–145)
WBC # BLD: 4.4 K/UL (ref 4–11)

## 2021-07-19 PROCEDURE — 33208 INSRT HEART PM ATRIAL & VENT: CPT

## 2021-07-19 PROCEDURE — 2580000003 HC RX 258: Performed by: INTERNAL MEDICINE

## 2021-07-19 PROCEDURE — 93660 TILT TABLE EVALUATION: CPT

## 2021-07-19 PROCEDURE — 4A033B1 MEASUREMENT OF ARTERIAL PRESSURE, PERIPHERAL, PERCUTANEOUS APPROACH: ICD-10-PCS | Performed by: INTERNAL MEDICINE

## 2021-07-19 PROCEDURE — 0JH606Z INSERTION OF PACEMAKER, DUAL CHAMBER INTO CHEST SUBCUTANEOUS TISSUE AND FASCIA, OPEN APPROACH: ICD-10-PCS | Performed by: INTERNAL MEDICINE

## 2021-07-19 PROCEDURE — 36415 COLL VENOUS BLD VENIPUNCTURE: CPT

## 2021-07-19 PROCEDURE — 6370000000 HC RX 637 (ALT 250 FOR IP): Performed by: INTERNAL MEDICINE

## 2021-07-19 PROCEDURE — 02HK3JZ INSERTION OF PACEMAKER LEAD INTO RIGHT VENTRICLE, PERCUTANEOUS APPROACH: ICD-10-PCS | Performed by: INTERNAL MEDICINE

## 2021-07-19 PROCEDURE — C1785 PMKR, DUAL, RATE-RESP: HCPCS

## 2021-07-19 PROCEDURE — 99233 SBSQ HOSP IP/OBS HIGH 50: CPT | Performed by: NURSE PRACTITIONER

## 2021-07-19 PROCEDURE — 33208 INSRT HEART PM ATRIAL & VENT: CPT | Performed by: INTERNAL MEDICINE

## 2021-07-19 PROCEDURE — 93660 TILT TABLE EVALUATION: CPT | Performed by: INTERNAL MEDICINE

## 2021-07-19 PROCEDURE — 6360000002 HC RX W HCPCS: Performed by: INTERNAL MEDICINE

## 2021-07-19 PROCEDURE — 4A0234Z MEASUREMENT OF CARDIAC ELECTRICAL ACTIVITY, PERCUTANEOUS APPROACH: ICD-10-PCS | Performed by: INTERNAL MEDICINE

## 2021-07-19 PROCEDURE — 80048 BASIC METABOLIC PNL TOTAL CA: CPT

## 2021-07-19 PROCEDURE — 2780000010 HC IMPLANT OTHER

## 2021-07-19 PROCEDURE — 2709999900 HC NON-CHARGEABLE SUPPLY

## 2021-07-19 PROCEDURE — C1898 LEAD, PMKR, OTHER THAN TRANS: HCPCS

## 2021-07-19 PROCEDURE — 93306 TTE W/DOPPLER COMPLETE: CPT

## 2021-07-19 PROCEDURE — 2000000000 HC ICU R&B

## 2021-07-19 PROCEDURE — 6360000002 HC RX W HCPCS

## 2021-07-19 PROCEDURE — 2500000003 HC RX 250 WO HCPCS

## 2021-07-19 PROCEDURE — 85027 COMPLETE CBC AUTOMATED: CPT

## 2021-07-19 PROCEDURE — 02H63JZ INSERTION OF PACEMAKER LEAD INTO RIGHT ATRIUM, PERCUTANEOUS APPROACH: ICD-10-PCS | Performed by: INTERNAL MEDICINE

## 2021-07-19 RX ORDER — SODIUM CHLORIDE 0.9 % (FLUSH) 0.9 %
5-40 SYRINGE (ML) INJECTION EVERY 12 HOURS SCHEDULED
Status: DISCONTINUED | OUTPATIENT
Start: 2021-07-19 | End: 2021-07-20

## 2021-07-19 RX ORDER — MIDAZOLAM HYDROCHLORIDE 5 MG/ML
INJECTION INTRAMUSCULAR; INTRAVENOUS
Status: COMPLETED | OUTPATIENT
Start: 2021-07-19 | End: 2021-07-19

## 2021-07-19 RX ORDER — FENTANYL CITRATE 50 UG/ML
INJECTION, SOLUTION INTRAMUSCULAR; INTRAVENOUS
Status: COMPLETED | OUTPATIENT
Start: 2021-07-19 | End: 2021-07-19

## 2021-07-19 RX ORDER — ATROPINE SULFATE 0.1 MG/ML
0.5 INJECTION INTRAVENOUS
Status: ACTIVE | OUTPATIENT
Start: 2021-07-19 | End: 2021-07-19

## 2021-07-19 RX ORDER — DOXYCYCLINE HYCLATE 100 MG
100 TABLET ORAL EVERY 12 HOURS SCHEDULED
Status: DISCONTINUED | OUTPATIENT
Start: 2021-07-19 | End: 2021-07-21 | Stop reason: HOSPADM

## 2021-07-19 RX ORDER — LORAZEPAM 0.5 MG/1
0.5 TABLET ORAL ONCE
Status: COMPLETED | OUTPATIENT
Start: 2021-07-19 | End: 2021-07-19

## 2021-07-19 RX ORDER — DIPHENHYDRAMINE HYDROCHLORIDE 50 MG/ML
INJECTION INTRAMUSCULAR; INTRAVENOUS
Status: COMPLETED | OUTPATIENT
Start: 2021-07-19 | End: 2021-07-19

## 2021-07-19 RX ORDER — BUPRENORPHINE AND NALOXONE 2; .5 MG/1; MG/1
1 FILM, SOLUBLE BUCCAL; SUBLINGUAL EVERY EVENING
Status: DISCONTINUED | OUTPATIENT
Start: 2021-07-19 | End: 2021-07-21 | Stop reason: HOSPADM

## 2021-07-19 RX ORDER — SODIUM CHLORIDE 0.9 % (FLUSH) 0.9 %
5-40 SYRINGE (ML) INJECTION PRN
Status: DISCONTINUED | OUTPATIENT
Start: 2021-07-19 | End: 2021-07-20

## 2021-07-19 RX ORDER — OXYCODONE HYDROCHLORIDE 5 MG/1
5 TABLET ORAL EVERY 4 HOURS PRN
Status: DISCONTINUED | OUTPATIENT
Start: 2021-07-19 | End: 2021-07-21 | Stop reason: HOSPADM

## 2021-07-19 RX ORDER — SODIUM CHLORIDE 9 MG/ML
25 INJECTION, SOLUTION INTRAVENOUS PRN
Status: DISCONTINUED | OUTPATIENT
Start: 2021-07-19 | End: 2021-07-21 | Stop reason: HOSPADM

## 2021-07-19 RX ADMIN — FENTANYL CITRATE 50 MCG: 50 INJECTION, SOLUTION INTRAMUSCULAR; INTRAVENOUS at 16:19

## 2021-07-19 RX ADMIN — MIDAZOLAM HYDROCHLORIDE 1 MG: 5 INJECTION INTRAMUSCULAR; INTRAVENOUS at 16:14

## 2021-07-19 RX ADMIN — FENTANYL CITRATE 50 MCG: 50 INJECTION, SOLUTION INTRAMUSCULAR; INTRAVENOUS at 16:30

## 2021-07-19 RX ADMIN — BUPRENORPHINE AND NALOXONE 1 FILM: 2; .5 FILM BUCCAL; SUBLINGUAL at 18:27

## 2021-07-19 RX ADMIN — DOXYCYCLINE HYCLATE 100 MG: 100 TABLET, COATED ORAL at 21:12

## 2021-07-19 RX ADMIN — LEVETIRACETAM 750 MG: 500 TABLET ORAL at 10:30

## 2021-07-19 RX ADMIN — MIDAZOLAM HYDROCHLORIDE 2 MG: 5 INJECTION INTRAMUSCULAR; INTRAVENOUS at 15:59

## 2021-07-19 RX ADMIN — MIDAZOLAM HYDROCHLORIDE 2 MG: 5 INJECTION INTRAMUSCULAR; INTRAVENOUS at 16:30

## 2021-07-19 RX ADMIN — FENTANYL CITRATE 50 MCG: 50 INJECTION, SOLUTION INTRAMUSCULAR; INTRAVENOUS at 16:14

## 2021-07-19 RX ADMIN — DIPHENHYDRAMINE HYDROCHLORIDE 25 MG: 50 INJECTION INTRAMUSCULAR; INTRAVENOUS at 16:33

## 2021-07-19 RX ADMIN — PAROXETINE HYDROCHLORIDE 10 MG: 20 TABLET, FILM COATED ORAL at 10:31

## 2021-07-19 RX ADMIN — MIDAZOLAM HYDROCHLORIDE 2 MG: 5 INJECTION INTRAMUSCULAR; INTRAVENOUS at 15:43

## 2021-07-19 RX ADMIN — MIDAZOLAM HYDROCHLORIDE 2 MG: 5 INJECTION INTRAMUSCULAR; INTRAVENOUS at 15:51

## 2021-07-19 RX ADMIN — SODIUM CHLORIDE, POTASSIUM CHLORIDE, SODIUM LACTATE AND CALCIUM CHLORIDE: 600; 310; 30; 20 INJECTION, SOLUTION INTRAVENOUS at 06:54

## 2021-07-19 RX ADMIN — ENOXAPARIN SODIUM 40 MG: 40 INJECTION SUBCUTANEOUS at 10:31

## 2021-07-19 RX ADMIN — FENTANYL CITRATE 50 MCG: 50 INJECTION, SOLUTION INTRAMUSCULAR; INTRAVENOUS at 15:59

## 2021-07-19 RX ADMIN — MIDAZOLAM HYDROCHLORIDE 2 MG: 5 INJECTION INTRAMUSCULAR; INTRAVENOUS at 16:23

## 2021-07-19 RX ADMIN — LORAZEPAM 0.5 MG: 0.5 TABLET ORAL at 19:14

## 2021-07-19 RX ADMIN — OXYCODONE 5 MG: 5 TABLET ORAL at 18:57

## 2021-07-19 RX ADMIN — FENTANYL CITRATE 50 MCG: 50 INJECTION, SOLUTION INTRAMUSCULAR; INTRAVENOUS at 15:51

## 2021-07-19 RX ADMIN — BUPRENORPHINE AND NALOXONE 1 FILM: 8; 2 FILM BUCCAL; SUBLINGUAL at 10:25

## 2021-07-19 RX ADMIN — MIDAZOLAM HYDROCHLORIDE 1 MG: 5 INJECTION INTRAMUSCULAR; INTRAVENOUS at 16:19

## 2021-07-19 ASSESSMENT — PAIN SCALES - GENERAL
PAINLEVEL_OUTOF10: 3
PAINLEVEL_OUTOF10: 0
PAINLEVEL_OUTOF10: 0
PAINLEVEL_OUTOF10: 5
PAINLEVEL_OUTOF10: 7

## 2021-07-19 ASSESSMENT — PAIN DESCRIPTION - PAIN TYPE: TYPE: SURGICAL PAIN

## 2021-07-19 NOTE — PROGRESS NOTES
Care assumed from previous RN. A&O, SB on tele, assessment complete as documented. HR currently 50s, pt denies dizziness or other symptoms. Pacer pads in place and atropine at bedside. Pt aware of possible tests/procedures in AM and need to remain NPO after 0000. Aware of need to call for ambulation assistance. Denies pain or other needs.

## 2021-07-19 NOTE — PROGRESS NOTES
HR maintaining at 35 on tele. BP before pt awakened 83/50, recovered to 114/65 with HR of high 40s after pt awakened all the way. Continues to deny dizziness. Bed alarm activated.

## 2021-07-19 NOTE — PLAN OF CARE
Problem: Self-Concept:  Goal: Level of anxiety will decrease  Description: Level of anxiety will decrease  Outcome: Ongoing     Problem: Nutrition  Goal: Optimal nutrition therapy  Outcome: Ongoing     Problem: Coping:  Goal: Ability to identify appropriate support needs will improve  Description: Ability to identify appropriate support needs will improve  Outcome: Ongoing

## 2021-07-19 NOTE — PROGRESS NOTES
[facility]     Electrophysiology                                     Progress Note    Admission date:  2021    Reason for follow up visit: sinus pause/syncope     HPI/CC: Steve Colbert was admitted on 2021 after a 19 second pause was noted on outpatient cardiac event monitor. Monitor was initially ordered on 2021 for syncope. Cardiology office was urgently notified of multiple pauses and heart rates in the 30's. Neurology is following for possible seizures versus syncope. Telemetry review showed multiple lengthy consecutive pauses on 2021 around 1700. Unclear if patient had any loss of consciousness/seizure activity with this. Rhythm has been sinus/sinus bradycardia. Subjective: He complains of intermittent dull \"heartache\" and occasional shortness of breath. He reports anxiety over current hospitalization. Presently denies palpitations or dizziness. Vitals:  Blood pressure (!) 103/58, pulse (!) 42, temperature 97.9 °F (36.6 °C), temperature source Oral, resp. rate 14, height 6' (1.829 m), weight 166 lb 3.2 oz (75.4 kg), SpO2 99 %.   Temp  Av.9 °F (36.6 °C)  Min: 97.6 °F (36.4 °C)  Max: 98.1 °F (36.7 °C)  Pulse  Av.9  Min: 35  Max: 72  BP  Min: 80/31  Max: 126/72  SpO2  Av.7 %  Min: 96 %  Max: 99 %    24 hour I/O    Intake/Output Summary (Last 24 hours) at 2021 1008  Last data filed at 2021 0518  Gross per 24 hour   Intake 1440 ml   Output 0 ml   Net 1440 ml     Current Facility-Administered Medications   Medication Dose Route Frequency Provider Last Rate Last Admin    atropine injection 0.5 mg  0.5 mg Intravenous Once PRN Zay Juliana, APRN - NP        buprenorphine-naloxone (SUBOXONE) 2-0.5 MG SL film 1 Film  1 Film Sublingual QPM Megan Storm MD        PARoxetine (PAXIL) tablet 10 mg  10 mg Oral Daily Megan Storm MD   10 mg at 21 1134    hydrOXYzine (VISTARIL) injection 50 mg  50 mg Intramuscular Q6H PRN Megan Storm MD  prochlorperazine (COMPAZINE) injection 10 mg  10 mg Intravenous Q6H PRN Brigitte Murillo MD        [Held by provider] midodrine (PROAMATINE) tablet 5 mg  5 mg Oral TID WC Kanika Rivas MD   5 mg at 07/18/21 1134    lactated ringers infusion   Intravenous Continuous Brigitte Murillo  mL/hr at 07/19/21 0857 Rate Change at 07/19/21 0857    buprenorphine-naloxone (SUBOXONE) 8-2 MG SL film 1 Film  1 Film Sublingual Daily Sasha Pressley MD   1 Film at 07/18/21 1133    levETIRAcetam (KEPPRA) tablet 750 mg  750 mg Oral BID Sasha Pressley MD   750 mg at 07/18/21 2052    LORazepam (ATIVAN) injection 2 mg  2 mg Intravenous Q6H PRN Sasha Pressley MD        sodium chloride flush 0.9 % injection 5-40 mL  5-40 mL Intravenous 2 times per day Sasha Pressley MD   10 mL at 07/17/21 0029    sodium chloride flush 0.9 % injection 5-40 mL  5-40 mL Intravenous PRN Sasha Pressley MD        0.9 % sodium chloride infusion  25 mL Intravenous PRN Sasha Pressley MD        enoxaparin (LOVENOX) injection 40 mg  40 mg Subcutaneous Daily Sasha Pressley MD   40 mg at 07/18/21 1133    ondansetron (ZOFRAN-ODT) disintegrating tablet 4 mg  4 mg Oral Q8H PRN Sasha Pressley MD        Or    ondansetron (ZOFRAN) injection 4 mg  4 mg Intravenous Q6H PRN Sasha Pressley MD   4 mg at 07/17/21 1212    polyethylene glycol (GLYCOLAX) packet 17 g  17 g Oral Daily PRN Sasha Pressley MD        acetaminophen (TYLENOL) tablet 650 mg  650 mg Oral Q6H PRN Sasha Pressley MD        Or    acetaminophen (TYLENOL) suppository 650 mg  650 mg Rectal Q6H PRN Sasha Pressley MD           Objective:     Telemetry monitor: SB    Physical Exam:  Constitutional and general appearance: alert, cooperative, no distress and appears stated age  HEENT: PERRL, no cervical lymphadenopathy. No masses palpable.  Normal oral mucosa  Respiratory:  · Normal excursion and expansion without use of accessory muscles  · Resp auscultation: Normal breath sounds without wheezing, rhonchi, and rales  Cardiovascular:  · The apical impulse is not displaced  · Heart tones are crisp and normal. regular S1 and S2.  · Jugular venous pulsation Normal  · The carotid upstroke is normal in amplitude and contour without delay or bruit  · Peripheral pulses are symmetrical and full   Abdomen:  · No masses or tenderness  · Bowel sounds present  Extremities:  ·  No cyanosis or clubbing  ·  No lower extremity edema  ·  Skin: warm and dry  Neurological:  · Alert and oriented  · Moves all extremities well  · No abnormalities of mood, affect, memory, mentation, or behavior are noted    Data  EKG 7/16/2021:  NSR 70 bpm        Echo 1/10/2018:   Summary   Normal LV systolic function with an estimated EF of 55%. Normal left ventricular diastolic filling pressure. The mitral valve leaflets appear mildly myxomatous. There is mild thickening of TV and possible echodensity seen on certain   views. Cannot r/o vegetation and recommend AI if clinically indicated. Trivial tricuspid regurgitation and mild pulmonic regurgitation   Systolic pulmonary artery pressure (SPAP) is normal and estimated at 29mmHg   (RA pressure 15mmHg). All labs and testing reviewed.   Lab Review     Renal Profile:   Lab Results   Component Value Date    CREATININE 0.9 07/17/2021    BUN 15 07/17/2021     07/17/2021    K 4.2 07/17/2021     07/17/2021    CO2 26 07/17/2021     CBC:    Lab Results   Component Value Date    WBC 7.2 07/16/2021    RBC 5.50 07/16/2021    HGB 15.9 07/16/2021    HCT 46.9 07/16/2021    MCV 85.3 07/16/2021    RDW 14.6 07/16/2021     07/16/2021     BNP:  No results found for: BNP  Fasting Lipid Panel:  No results found for: CHOL, HDL, TRIG  Cardiac Enzymes:  CK/MbTroponin  Lab Results   Component Value Date    TROPONINI <0.01 07/16/2021     PT/ INR   Lab Results   Component Value Date    INR 1.23 01/09/2018    PROTIME 13.9 01/09/2018     PTT No results found for: PTT   Lab Results   Component Value Date    MG 2.50 07/16/2021    No results found for: TSH    Assessment:  Sinus pause/sinus bradycardia: ongoing   -multiple pauses (>15 seconds) noted on telemetry review and event monitor   -TSH normal 7/2021  Syncope vs seizures: recurrent    -events started 4/2021   -neurology following   -midodrine started 7/18/2021 for possible vasodepressor syncope   History of heroin abuse   -long term suboxone use (2018)  Cannabis use:   -UDS positive 7/2021  Tobacco abuse: smoking cessation is recommended  Hepatitis C  Hyperbilirubinemia     Plan:   Plan for tilt table test this afternoon  Midodrine on hold awaiting testing   NPO for possible pacemaker implant   Okay to transfer to ICU due to ongoing sinus pauses   Atropine at bedside   Will ask for updated echo today   EEG pending     Dr. Abelardo Cooper made aware of pauses.      Grady Arce, DESMOND-CNP  [address]  [phone]

## 2021-07-19 NOTE — PROGRESS NOTES
Spoke with Dr. Pinky Burgos regarding the Table Tilt Test and MD states RN does NOT have to accompany pt during procedure.

## 2021-07-19 NOTE — PROGRESS NOTES
Hospitalist Progress Note      PCP: Jacky Chen, APRN - CNP    Date of Admission: 7/16/2021    Chief Complaint: spells       Subjective:  Tele shows that he had multiple long spells of asystole yesterday evening. They mostly occurred around 17:01. One was 16 seconds, then a single beat, followed by another 7 seconds of asystole. A minute later he had a 10 second pause, then two beats, followed by a 23 second period of asystole. I alerted EP this AM.  The patient doesn't remember passing out yesterday evening, he must have just been lying in bed and went unconscious for awhile without realizing it. He feels fine currently. HR sustains mid 30's when sleeping, about 50 this morning while awake. Medications:  Reviewed    Infusion Medications    lactated ringers 75 mL/hr at 07/19/21 0654    sodium chloride       Scheduled Medications    buprenorphine-naloxone  1 Film Sublingual QPM    PARoxetine  10 mg Oral Daily    [Held by provider] midodrine  5 mg Oral TID WC    buprenorphine-naloxone  1 Film Sublingual Daily    levETIRAcetam  750 mg Oral BID    sodium chloride flush  5-40 mL Intravenous 2 times per day    enoxaparin  40 mg Subcutaneous Daily     PRN Meds: atropine, hydrOXYzine, prochlorperazine, LORazepam, sodium chloride flush, sodium chloride, ondansetron **OR** ondansetron, polyethylene glycol, acetaminophen **OR** acetaminophen      Intake/Output Summary (Last 24 hours) at 7/19/2021 0850  Last data filed at 7/19/2021 0518  Gross per 24 hour   Intake 1440 ml   Output 0 ml   Net 1440 ml       Physical Exam Performed:    BP (!) 103/58   Pulse (!) 42   Temp 97.9 °F (36.6 °C) (Oral)   Resp 14   Ht 6' (1.829 m)   Wt 166 lb 3.2 oz (75.4 kg)   SpO2 99%   BMI 22.54 kg/m²     General appearance: No apparent distress, appears stated age and cooperative. HEENT: Pupils equal, round, and reactive to light. Conjunctivae/corneas clear. Neck: Supple, with full range of motion.  No jugular venous distention. Trachea midline. Respiratory:  Normal respiratory effort. Clear to auscultation, bilaterally without Rales/Wheezes/Rhonchi. Cardiovascular: bradycardic rate and regular rhythm with normal S1/S2 without murmurs, rubs or gallops. Abdomen: Soft, non-tender, non-distended with normal bowel sounds. Musculoskeletal: No clubbing, cyanosis or edema bilaterally. Full range of motion without deformity. Skin: Skin color, texture, turgor normal.  No rashes or lesions. Neurologic:  Neurovascularly intact without any focal sensory/motor deficits. Cranial nerves: II-XII intact, grossly non-focal.  Psychiatric: Alert and oriented, thought content appropriate, normal insight  Capillary Refill: Brisk,3 seconds, normal   Peripheral Pulses: +2 palpable, equal bilaterally       Labs:   Recent Labs     07/16/21 2009   WBC 7.2   HGB 15.9   HCT 46.9        Recent Labs     07/16/21 2009 07/17/21  0502   * 138   K 4.9 4.2   CL 95* 104   CO2 27 26   BUN 15 15   CREATININE 0.9 0.9   CALCIUM 9.6 8.9     Recent Labs     07/16/21 2009   AST 66*   ALT 87*   BILITOT 1.3*   ALKPHOS 63     No results for input(s): INR in the last 72 hours. Recent Labs     07/16/21 2009   TROPONINI <0.01       Urinalysis:      Lab Results   Component Value Date    NITRU Negative 07/16/2021    WBCUA 3-5 01/09/2018    BACTERIA Rare 01/09/2018    RBCUA 0-2 01/09/2018    BLOODU Negative 07/16/2021    SPECGRAV <=1.005 07/16/2021    GLUCOSEU Negative 07/16/2021       Radiology:  XR CHEST PORTABLE   Final Result   No acute cardiopulmonary disease.          MRI BRAIN W WO CONTRAST    (Results Pending)           Assessment/Plan:    Active Hospital Problems    Diagnosis     Sinus node dysfunction ([address] [I49.5]     Tobacco abuse [Z72.0]     Cardiac asystole (HCC) [I46.9]     Acute vomiting [R11.10]     Breakthrough seizure ([address] [G40.919]     Chronic hepatitis C without hepatic coma ([address] [B18.2]     Heroin abuse ([address] [F11.10]  Syncope and collapse [R55]        The patient is a pleasant 28 Y M with a long history of substance abuse. He has been incarcerated for much of his adult life, but recently cleaned up his act, moved back in with his family, and has been taking suboxone. He still smokes marijuana but abstains from other street drugs. He has been having self-limited spells of unconsciousness fairly frequently lately. It usually happens in the morning when he stands up out of bed. He suddenly feels lightheaded and weak all over. The next thing he knows he is \"waking up on the floor and my mom is freaking out. \"  He says that he always urinates on himself during these spells. He says that they usually last about a minute, and within seconds of waking up he is completely alert and oriented without a postictal phase. His mom tells him that his arms stick straight out in front of him, he is completely unconscious, and that he has gurgling breathing during these spells. He doesn't seems to \"twist up like a pretzel and shake\" like his friend who has a known seizure disorder. He doesn't really go limp and lifeless, either, though. The patient is stoic and doesn't seem to be embellishing his symptoms. Cardiology recently gave him an outpatient cardiac monitor, and reportedly it has been showing multiple long asystolic periods corresponding to these spells. One of these spells was 35 seconds long. Cardiology called him and told him to come to the ED, prompting this admission. He feels pretty normal when he isn't having these spells, and he is eager to go back home. Suspected syncope due to spells of asystole. - he had multiple long systolic pauses here on tele as well. On 7/18 at 17:02 he had a 10 second pause, then two beats, followed by a 23 second period of asystole. - electrolytes fine. Normal TSH. F/u TTE.  - EP determining whether he needs a pacemaker. - suboxone seems to be a stabilizing factor in his life. Would like to continue this if at all possible. Perhaps he can quit the marijuana. - no convincing evidence of ongoing myocarditis. Trop negative. Normal CRP. He did get both doses of the [address] vaccine, the second dose on 4/24/2021, and his first spell of syncope was about two weeks later. He does fit the young male demographic. Typically myocarditis has occurred within a week of the second dose. The patient didn't have any chest pain at that time, but he did have the common side effect of subjective fevers and chills. His symptoms seemed to last a little longer than most, about 3 days. 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 Possible seizures. A primary seizure disorder seems very unlikely. He is on keppra, but neurology said that if his symptoms seem to resolve after an EP intervention then keppra may not be necessary in the future. - No MRI of the brain necessary, no need to delay pacer placement. HCV. Consider outpatient testing and treatment. Tobacco smoking, nicotine dependence. Encouraged to quit. Educated about risks. Anxiety. Started paroxetine. PCP could increase every 1-2 weeks as indicated. DVT Prophylaxis: enoxaparin  Diet: Diet NPO Exceptions are: Sips of Water with Meds  Code Status: Full Code    PT/OT Eval Status: not indicated    Dispo - when EP is OK with discharge. Unclear at this point. IPTA, no needs.       Nikos Garcia MD

## 2021-07-19 NOTE — PROCEDURES
fluoroscopic guidance. Threshold measurements were obtained to the 's specifications, and the lead was anchored to the pectoralis fascia using 2-0 silk. After hemostasis was assured, the pulse generator was connected to the atrial and ventricular leads and appropriate lead pacing/impedance was confirmed. The device was anchored to the pocket using a 2-0 silk and then placed into the pocket with care to ensure the leads were positioned beneath the device. The pocket was irrigated with antibiotic solution. The fascia was closed using interrupted vicryl sutures. The skin and subcutaneous tissues were approximated using running 3-0 Vicryl sutures, and the wound was sealed with poly-cyanoacrylate solution. A sterile dressing was applied to the site. The patient tolerated the procedure well and there were no complications. At the conclusion of the procedure, all sponges and sharps were accounted for by two counts. The attending physician, Varun Ferrera MD was present for the entire procedure and for interpretation of data. Device and Lead Information  Pulse Generator Model # Manfacturer Serial # Location   [id number] Medtronic [id number] left pectoral, subcutaneous     Lead Model Number Manfacturer Serial Number Lead position   RA [id number] Medtronic [id number] RA appendage   RV [id number] Medtronic [id number] RV apex (septum)     Lead Sensing and Thresholds  Lead R/P sensing (mV) Threshold (V) Threshold PW (msec) Impedance (?) Final Voltage (V) Final PW (msec)   RA 2.4 1.0 0.4 513 3.5 0.4   RV 11.6 0.5 0.4 779 3.5 0.4     Bradycardia Settings  Rony Mode LRL URL Pace AVD (ms) Sense AVD (ms) Mode Switching Mode SW Rate   AAIR - DDDR 60 150 180 150      Proceduralist Notes:  - Successful DC pacemaker implantation.  - Doxycycline x 5 days.

## 2021-07-19 NOTE — PROGRESS NOTES
HR down to 32 on tele (strip available in hard chart). On assessment, pt arousable, BP 87/45, no dizziness, fell back asleep quickly. Atropine remains at bedside and pacer pads remain in place. Pt instructed to call for ambulation assistance, bed alarm activated.

## 2021-07-19 NOTE — PROGRESS NOTES
Patient being transferred to ICU. Gave bedside verbal report to [facility]. Notified [facility] and patient switched to ICU bedside monitor. Belongings sent with patient. Escorted patient with other RN to room 238.

## 2021-07-20 ENCOUNTER — APPOINTMENT (OUTPATIENT)
Dept: GENERAL RADIOLOGY | Age: 31
DRG: 171 | End: 2021-07-20
Payer: COMMERCIAL

## 2021-07-20 ENCOUNTER — PROCEDURE VISIT (OUTPATIENT)
Dept: CARDIOLOGY CLINIC | Age: 31
End: 2021-07-20
Payer: COMMERCIAL

## 2021-07-20 DIAGNOSIS — Z95.0 PACEMAKER: ICD-10-CM

## 2021-07-20 DIAGNOSIS — R55 SYNCOPE AND COLLAPSE: ICD-10-CM

## 2021-07-20 DIAGNOSIS — I49.5 SINUS NODE DYSFUNCTION (HCC): ICD-10-CM

## 2021-07-20 DIAGNOSIS — I46.9 CARDIAC ASYSTOLE (HCC): ICD-10-CM

## 2021-07-20 LAB
ANION GAP SERPL CALCULATED.3IONS-SCNC: 9 MMOL/L (ref 3–16)
BUN BLDV-MCNC: 16 MG/DL (ref 7–20)
CALCIUM SERPL-MCNC: 9.5 MG/DL (ref 8.3–10.6)
CHLORIDE BLD-SCNC: 103 MMOL/L (ref 99–110)
CO2: 27 MMOL/L (ref 21–32)
CREAT SERPL-MCNC: 0.8 MG/DL (ref 0.9–1.3)
GFR AFRICAN AMERICAN: >60
GFR NON-AFRICAN AMERICAN: >60
GLUCOSE BLD-MCNC: 94 MG/DL (ref 70–99)
HCT VFR BLD CALC: 45.5 % (ref 40.5–52.5)
HEMOGLOBIN: 15.5 G/DL (ref 13.5–17.5)
MCH RBC QN AUTO: 28.8 PG (ref 26–34)
MCHC RBC AUTO-ENTMCNC: 34 G/DL (ref 31–36)
MCV RBC AUTO: 84.6 FL (ref 80–100)
PDW BLD-RTO: 14.9 % (ref 12.4–15.4)
PLATELET # BLD: 188 K/UL (ref 135–450)
PMV BLD AUTO: 8.2 FL (ref 5–10.5)
POTASSIUM REFLEX MAGNESIUM: 4.5 MMOL/L (ref 3.5–5.1)
RBC # BLD: 5.38 M/UL (ref 4.2–5.9)
SODIUM BLD-SCNC: 139 MMOL/L (ref 136–145)
WBC # BLD: 7.4 K/UL (ref 4–11)

## 2021-07-20 PROCEDURE — 6360000002 HC RX W HCPCS: Performed by: INTERNAL MEDICINE

## 2021-07-20 PROCEDURE — 2500000003 HC RX 250 WO HCPCS

## 2021-07-20 PROCEDURE — 6370000000 HC RX 637 (ALT 250 FOR IP): Performed by: INTERNAL MEDICINE

## 2021-07-20 PROCEDURE — 36415 COLL VENOUS BLD VENIPUNCTURE: CPT

## 2021-07-20 PROCEDURE — 02WA3MZ REVISION OF CARDIAC LEAD IN HEART, PERCUTANEOUS APPROACH: ICD-10-PCS | Performed by: INTERNAL MEDICINE

## 2021-07-20 PROCEDURE — 2580000003 HC RX 258: Performed by: INTERNAL MEDICINE

## 2021-07-20 PROCEDURE — 99233 SBSQ HOSP IP/OBS HIGH 50: CPT | Performed by: NURSE PRACTITIONER

## 2021-07-20 PROCEDURE — 93286 PERI-PX EVAL PM/LDLS PM IP: CPT | Performed by: INTERNAL MEDICINE

## 2021-07-20 PROCEDURE — 71046 X-RAY EXAM CHEST 2 VIEWS: CPT

## 2021-07-20 PROCEDURE — 6360000002 HC RX W HCPCS

## 2021-07-20 PROCEDURE — 80048 BASIC METABOLIC PNL TOTAL CA: CPT

## 2021-07-20 PROCEDURE — 85027 COMPLETE CBC AUTOMATED: CPT

## 2021-07-20 PROCEDURE — 2000000000 HC ICU R&B

## 2021-07-20 PROCEDURE — 33215 REPOSITION PACING-DEFIB LEAD: CPT

## 2021-07-20 PROCEDURE — 2580000003 HC RX 258

## 2021-07-20 PROCEDURE — 33215 REPOSITION PACING-DEFIB LEAD: CPT | Performed by: INTERNAL MEDICINE

## 2021-07-20 RX ORDER — SODIUM CHLORIDE 0.9 % (FLUSH) 0.9 %
5-40 SYRINGE (ML) INJECTION PRN
Status: DISCONTINUED | OUTPATIENT
Start: 2021-07-20 | End: 2021-07-20 | Stop reason: SDUPTHER

## 2021-07-20 RX ORDER — SODIUM CHLORIDE 9 MG/ML
25 INJECTION, SOLUTION INTRAVENOUS PRN
Status: DISCONTINUED | OUTPATIENT
Start: 2021-07-20 | End: 2021-07-20 | Stop reason: SDUPTHER

## 2021-07-20 RX ORDER — FENTANYL CITRATE 50 UG/ML
INJECTION, SOLUTION INTRAMUSCULAR; INTRAVENOUS
Status: COMPLETED | OUTPATIENT
Start: 2021-07-20 | End: 2021-07-20

## 2021-07-20 RX ORDER — MIDAZOLAM HYDROCHLORIDE 5 MG/ML
INJECTION INTRAMUSCULAR; INTRAVENOUS
Status: COMPLETED | OUTPATIENT
Start: 2021-07-20 | End: 2021-07-20

## 2021-07-20 RX ORDER — SODIUM CHLORIDE 0.9 % (FLUSH) 0.9 %
5-40 SYRINGE (ML) INJECTION EVERY 12 HOURS SCHEDULED
Status: DISCONTINUED | OUTPATIENT
Start: 2021-07-20 | End: 2021-07-20 | Stop reason: SDUPTHER

## 2021-07-20 RX ORDER — PAROXETINE HYDROCHLORIDE 20 MG/1
10 TABLET, FILM COATED ORAL ONCE
Status: COMPLETED | OUTPATIENT
Start: 2021-07-20 | End: 2021-07-20

## 2021-07-20 RX ORDER — HYDROXYZINE HYDROCHLORIDE 10 MG/1
50 TABLET, FILM COATED ORAL 3 TIMES DAILY PRN
Status: DISCONTINUED | OUTPATIENT
Start: 2021-07-20 | End: 2021-07-21 | Stop reason: HOSPADM

## 2021-07-20 RX ORDER — DIPHENHYDRAMINE HYDROCHLORIDE 50 MG/ML
INJECTION INTRAMUSCULAR; INTRAVENOUS
Status: COMPLETED | OUTPATIENT
Start: 2021-07-20 | End: 2021-07-20

## 2021-07-20 RX ORDER — PAROXETINE HYDROCHLORIDE 20 MG/1
20 TABLET, FILM COATED ORAL DAILY
Status: DISCONTINUED | OUTPATIENT
Start: 2021-07-21 | End: 2021-07-21 | Stop reason: HOSPADM

## 2021-07-20 RX ADMIN — MIDAZOLAM HYDROCHLORIDE 2 MG: 5 INJECTION, SOLUTION INTRAMUSCULAR; INTRAVENOUS at 17:28

## 2021-07-20 RX ADMIN — ONDANSETRON 4 MG: 4 TABLET, ORALLY DISINTEGRATING ORAL at 10:23

## 2021-07-20 RX ADMIN — MUPIROCIN: 20 OINTMENT TOPICAL at 20:00

## 2021-07-20 RX ADMIN — FENTANYL CITRATE 25 MCG: 50 INJECTION INTRAMUSCULAR; INTRAVENOUS at 17:38

## 2021-07-20 RX ADMIN — DOXYCYCLINE HYCLATE 100 MG: 100 TABLET, COATED ORAL at 19:53

## 2021-07-20 RX ADMIN — FENTANYL CITRATE 25 MCG: 50 INJECTION INTRAMUSCULAR; INTRAVENOUS at 17:47

## 2021-07-20 RX ADMIN — FENTANYL CITRATE 25 MCG: 50 INJECTION INTRAMUSCULAR; INTRAVENOUS at 17:48

## 2021-07-20 RX ADMIN — FENTANYL CITRATE 50 MCG: 50 INJECTION INTRAMUSCULAR; INTRAVENOUS at 17:33

## 2021-07-20 RX ADMIN — Medication 10 ML: at 09:12

## 2021-07-20 RX ADMIN — BUPRENORPHINE AND NALOXONE 1 FILM: 8; 2 FILM BUCCAL; SUBLINGUAL at 09:12

## 2021-07-20 RX ADMIN — MIDAZOLAM HYDROCHLORIDE 1 MG: 5 INJECTION, SOLUTION INTRAMUSCULAR; INTRAVENOUS at 17:38

## 2021-07-20 RX ADMIN — VANCOMYCIN HYDROCHLORIDE 1000 MG: 1 INJECTION, POWDER, LYOPHILIZED, FOR SOLUTION INTRAVENOUS at 17:30

## 2021-07-20 RX ADMIN — ACETAMINOPHEN 650 MG: 325 TABLET ORAL at 08:01

## 2021-07-20 RX ADMIN — ACETAMINOPHEN 650 MG: 325 TABLET ORAL at 03:15

## 2021-07-20 RX ADMIN — Medication 10 ML: at 19:54

## 2021-07-20 RX ADMIN — PAROXETINE HYDROCHLORIDE 10 MG: 20 TABLET, FILM COATED ORAL at 09:12

## 2021-07-20 RX ADMIN — FENTANYL CITRATE 50 MCG: 50 INJECTION INTRAMUSCULAR; INTRAVENOUS at 17:28

## 2021-07-20 RX ADMIN — MIDAZOLAM HYDROCHLORIDE 1 MG: 5 INJECTION, SOLUTION INTRAMUSCULAR; INTRAVENOUS at 17:55

## 2021-07-20 RX ADMIN — MIDAZOLAM HYDROCHLORIDE 1 MG: 5 INJECTION, SOLUTION INTRAMUSCULAR; INTRAVENOUS at 17:47

## 2021-07-20 RX ADMIN — ACETAMINOPHEN 650 MG: 325 TABLET ORAL at 22:58

## 2021-07-20 RX ADMIN — FENTANYL CITRATE 25 MCG: 50 INJECTION INTRAMUSCULAR; INTRAVENOUS at 17:42

## 2021-07-20 RX ADMIN — MIDAZOLAM HYDROCHLORIDE 2 MG: 5 INJECTION, SOLUTION INTRAMUSCULAR; INTRAVENOUS at 17:33

## 2021-07-20 RX ADMIN — BUPRENORPHINE AND NALOXONE 1 FILM: 2; .5 FILM BUCCAL; SUBLINGUAL at 19:53

## 2021-07-20 RX ADMIN — DIPHENHYDRAMINE HYDROCHLORIDE 25 MG: 50 INJECTION, SOLUTION INTRAMUSCULAR; INTRAVENOUS at 17:27

## 2021-07-20 RX ADMIN — HYDROXYZINE HYDROCHLORIDE 50 MG: 10 TABLET ORAL at 12:15

## 2021-07-20 RX ADMIN — MIDAZOLAM HYDROCHLORIDE 1 MG: 5 INJECTION, SOLUTION INTRAMUSCULAR; INTRAVENOUS at 17:42

## 2021-07-20 RX ADMIN — PAROXETINE HYDROCHLORIDE 10 MG: 20 TABLET, FILM COATED ORAL at 12:14

## 2021-07-20 RX ADMIN — DOXYCYCLINE HYCLATE 100 MG: 100 TABLET, COATED ORAL at 09:12

## 2021-07-20 ASSESSMENT — PAIN DESCRIPTION - LOCATION
LOCATION: CHEST;SHOULDER
LOCATION: CHEST;SHOULDER
LOCATION: CHEST

## 2021-07-20 ASSESSMENT — PAIN DESCRIPTION - PAIN TYPE
TYPE: SURGICAL PAIN

## 2021-07-20 ASSESSMENT — PAIN SCALES - GENERAL
PAINLEVEL_OUTOF10: 3
PAINLEVEL_OUTOF10: 0
PAINLEVEL_OUTOF10: 0
PAINLEVEL_OUTOF10: 4
PAINLEVEL_OUTOF10: 0
PAINLEVEL_OUTOF10: 5
PAINLEVEL_OUTOF10: 3

## 2021-07-20 ASSESSMENT — PAIN DESCRIPTION - DESCRIPTORS
DESCRIPTORS: ACHING;SORE

## 2021-07-20 ASSESSMENT — PAIN DESCRIPTION - FREQUENCY
FREQUENCY: CONTINUOUS

## 2021-07-20 ASSESSMENT — PAIN DESCRIPTION - ORIENTATION
ORIENTATION: LEFT

## 2021-07-20 ASSESSMENT — PAIN DESCRIPTION - ONSET
ONSET: ON-GOING
ONSET: ON-GOING

## 2021-07-20 NOTE — PLAN OF CARE
Problem: Coping:  Goal: Ability to cope will improve  Description: Ability to cope will improve  Outcome: Ongoing  Goal: Ability to identify appropriate support needs will improve  Description: Ability to identify appropriate support needs will improve  Outcome: Ongoing     Problem: Health Behavior:  Goal: Ability to manage health-related needs will improve  Description: Ability to manage health-related needs will improve  Outcome: Ongoing     Problem: Safety:  Goal: Ability to remain free from injury will improve  Description: Ability to remain free from injury will improve  Outcome: Ongoing     Problem: Self-Concept:  Goal: Level of anxiety will decrease  Description: Level of anxiety will decrease  Outcome: Ongoing  Goal: Ability to verbalize feelings about condition will improve  Description: Ability to verbalize feelings about condition will improve  Outcome: Ongoing     Problem: Nutrition  Goal: Optimal nutrition therapy  Outcome: Ongoing     Problem: Falls - Risk of:  Goal: Will remain free from falls  Description: Will remain free from falls  Outcome: Ongoing  Goal: Absence of physical injury  Description: Absence of physical injury  Outcome: Ongoing

## 2021-07-20 NOTE — PROGRESS NOTES
A inpatient rep check one day s/p dual chamber pacemaker implant. AP 96.4%  90.2%    No arrhythmias recorded. Atrial lead position check failed on 20-Jul-2021. Rate Hysteresis turned off, LRL set to 30 bpm, Mode changed to VVI by company representative. See P[employer]art report under the Cardiology tab.

## 2021-07-20 NOTE — PROGRESS NOTES
Hospitalist Progress Note      PCP: DESMOND Jones - CNP    Date of Admission: 7/16/2021    Chief Complaint: spells       Subjective:  One of the pacer leads appears to not be functioning well. He is frustrated. Pain controlled. Medications:  Reviewed    Infusion Medications    sodium chloride       Scheduled Medications    mupirocin   Nasal BID    buprenorphine-naloxone  1 Film Sublingual QPM    doxycycline hyclate  100 mg Oral 2 times per day    PARoxetine  10 mg Oral Daily    buprenorphine-naloxone  1 Film Sublingual Daily    sodium chloride flush  5-40 mL Intravenous 2 times per day    enoxaparin  40 mg Subcutaneous Daily     PRN Meds: sodium chloride, oxyCODONE, hydrOXYzine, prochlorperazine, LORazepam, sodium chloride flush, ondansetron **OR** ondansetron, polyethylene glycol, acetaminophen **OR** acetaminophen      Intake/Output Summary (Last 24 hours) at 7/20/2021 0927  Last data filed at 7/20/2021 0800  Gross per 24 hour   Intake 2964.68 ml   Output 1175 ml   Net 1789.68 ml       Physical Exam Performed:    /64   Pulse 60   Temp 97.2 °F (36.2 °C) (Oral)   Resp 10   Ht 6' (1.829 m)   Wt 170 lb 3.1 oz (77.2 kg)   SpO2 93%   BMI 23.08 kg/m²     General appearance: No apparent distress, appears stated age and cooperative. HEENT: Pupils equal, round, and reactive to light. Conjunctivae/corneas clear. Neck: Supple, with full range of motion. No jugular venous distention. Trachea midline. Respiratory:  Normal respiratory effort. Clear to auscultation, bilaterally without Rales/Wheezes/Rhonchi. Cardiovascular: Rate in the 50's and regular rhythm with normal S1/S2 without murmurs, rubs or gallops. Abdomen: Soft, non-tender, non-distended with normal bowel sounds. Musculoskeletal: No clubbing, cyanosis or edema bilaterally. Full range of motion without deformity. Skin: Skin color, texture, turgor normal.  No rashes or lesions.   Neurologic:  Neurovascularly intact without any focal sensory/motor deficits. Cranial nerves: II-XII intact, grossly non-focal.  Psychiatric: Alert and oriented, thought content appropriate, normal insight  Capillary Refill: Brisk,3 seconds, normal   Peripheral Pulses: +2 palpable, equal bilaterally       Labs:   Recent Labs     07/19/21  1005 07/20/21  0432   WBC 4.4 7.4   HGB 14.5 15.5   HCT 43.3 45.5    188     Recent Labs     07/19/21  1005 07/20/21  0433    139   K 4.8 4.5    103   CO2 29 27   BUN 19 16   CREATININE 0.8* 0.8*   CALCIUM 9.8 9.5     No results for input(s): AST, ALT, BILIDIR, BILITOT, ALKPHOS in the last 72 hours. No results for input(s): INR in the last 72 hours. No results for input(s): [place] Breeze in the last 72 hours. Urinalysis:      Lab Results   Component Value Date    NITRU Negative 07/16/2021    WBCUA 3-5 01/09/2018    BACTERIA Rare 01/09/2018    RBCUA 0-2 01/09/2018    BLOODU Negative 07/16/2021    SPECGRAV <=1.005 07/16/2021    GLUCOSEU Negative 07/16/2021       Radiology:  XR CHEST (2 VW)   Final Result   No acute abnormality. XR CHEST PORTABLE   Final Result   No acute cardiopulmonary disease. Assessment/Plan:    Active Hospital Problems    Diagnosis     Sinus node dysfunction ([facility] [I49.5]     Tobacco abuse [Z72.0]     Cardiac asystole (HCC) [I46.9]     Acute vomiting [R11.10]     Breakthrough seizure ([facility] [G40.919]     Chronic hepatitis C without hepatic coma ([facility] [B18.2]     Heroin abuse ([facility] [F11.10]     Syncope and collapse [R55]        The patient is a pleasant 28 Y M with a long history of substance abuse. He has been incarcerated for much of his adult life, but recently cleaned up his act, moved back in with his family, and has been taking suboxone. He still smokes marijuana but abstains from other street drugs. He has been having self-limited spells of unconsciousness fairly frequently lately.   It usually happens in the morning when he stands at that time, but he did have the common side effect of subjective fevers and chills. His symptoms seemed to last a little longer than most, about 3 days. Elizabeth Mock was alerted regarding the situation in case this needs to be reported. 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 Seizure disorder clinically ruled out. Stopped Keppra. HCV. Consider outpatient testing and treatment. Tobacco smoking, nicotine dependence. Encouraged to quit. Educated about risks. Anxiety. Started paroxetine. PCP could increase every 1-2 weeks as indicated. DVT Prophylaxis: enoxaparin  Diet: Diet NPO Exceptions are: Sips of Water with Meds  Adult Oral Nutrition Supplement; Standard High Calorie/High Protein Oral Supplement  Code Status: Full Code    PT/OT Eval Status: not indicated    Dispo - when EP is OK with discharge. Unclear at this point, perhaps 7/21. IPTA, no needs.       Tre Roman MD

## 2021-07-20 NOTE — PROCEDURES
Electrophysiology Procedure Note    Attending MD Jean Pierre Wade MD    Procedures Right atrial lead revision    Indications   RA lead dislodgement  Complication None  EBL  <[id number]  Conclusions Successful RA lead revision    Start: 1728  Stop: 1810    Sedation: Fentanyl - 200 mcg, versed - 8 mg, and benadryl - 25 mg    Description of Procedure    The patient was brought to the electrophysiology laboratory in the fasting state after informed consent was obtained. The patient was placed in the supine position and the left pectoral area was prepared and draped in a sterile fashion. The electrocardiogram, blood pressure, and oxygenation were monitored intra- and post-procedure. Intravenous antibiotic was given prior to the procedure for general antibiotic prophylaxis. After using buffered lidocaine 1% with epinephrine (1/100,000) for local anesthesia to the left pectoral area, a 4-5 cm incision was made along the patient's existing scar line with care not to damage the underlying leads. The subcutaneous and scar tissues were dissected and the chronically implanted device was freed from the existing scar tissue and removed from the pocket. The RA lead was freed from the pocket. The RA lead was repositioned under fluoroscopic guidance; after appropriate lead pacing/impedance measurements, according to 's specifications, were confirmed, the lead was sutured into place using 2-0 silk. The RA lead was inserted into the pacemaker. The device was placed into the pocket within previously implanted TyRx pouch, ensuring the leads were positioned underneath the device, and the wound was flushed with antibacterial solution and hemostasis confirmed. The fascia was closed using interrupted 2-0 Vicryl. The subcutaneous tissue and skin were approximated using running 3-0 Vicryl sutures and poly-cyanoacrylate solution was applied to the site and a sterile dressing was applied.   The patient tolerated the procedure well and there were no complications. At the conclusion of the procedure, all sponges and sharps were accounted for by two counts. The attending physician, Jairo Kwong MD was present for the entire procedure and for interpretation of data.     Device and Lead Information  Pulse Generator Model # Manfacturer Serial # Location   [id number] Medtronic [id number] left pectoral, subcutaneous     Lead Model Number Manfacturer Serial Number Lead position Implant/Explant   RA [id number] Medtronic [id number] RA Existing   RV [id number] Medtronic [id number] RV Existing     Lead Sensing and Thresholds  Lead R/P sensing (mV) Threshold (V) Threshold PW (msec) Impedance (?) Final Voltage (V) Final PW (msec)   RA 3.4 0.5 0.4 608 3.5 0.4   RV 12.1 0.75 0.4 608 3.5 0.4     Bradycardia Settings  Rony Mode LRL URL PAV NAN Mode Switching Mode SW Rate   AAIR - DDDR 60 150 180 150      Proceduralist Notes:  - Successful RA lead revision.  - CXR in am.

## 2021-07-20 NOTE — PROGRESS NOTES
[facility]     Electrophysiology                                     Progress Note    Admission date:  2021    Reason for follow up visit: sinus pause/syncope     HPI/CC: Adrian Rodriguez was admitted on 2021 after a 19 second pause was noted on outpatient cardiac event monitor. Monitor was initially ordered on 2021 for syncope. Cardiology office was urgently notified of multiple pauses and heart rates in the 30's. Neurology is following for possible seizures versus syncope. Telemetry review showed multiple lengthy consecutive pauses on 2021 around 1700. Unclear if patient had any loss of consciousness/seizure activity with this. On 2021, he underwent dual chamber pacemaker. Today, device interrogation and post device CXR showed RA lead dislodgement. Rhythm has been sinus bradycardia. Subjective: Denies chest pain, palpitations, shortness of breath, and dizziness. He is frustrated with hospitalization and need for additional procedures. Vitals:  Blood pressure 111/70, pulse (!) 49, temperature 97.7 °F (36.5 °C), temperature source Oral, resp. rate 13, height 6' (1.829 m), weight 170 lb 3.1 oz (77.2 kg), SpO2 95 %.   Temp  Av.6 °F (36.4 °C)  Min: 97.2 °F (36.2 °C)  Max: 97.9 °F (36.6 °C)  Pulse  Av.5  Min: 48  Max: 95  BP  Min: 91/58  Max: 128/107  SpO2  Av.9 %  Min: 92 %  Max: 98 %    24 hour I/O    Intake/Output Summary (Last 24 hours) at 2021 1126  Last data filed at 2021 0800  Gross per 24 hour   Intake 2964.68 ml   Output 1175 ml   Net 1789.68 ml     Current Facility-Administered Medications   Medication Dose Route Frequency Provider Last Rate Last Admin    mupirocin (BACTROBAN) 2 % ointment   Nasal BID Lana Syed MD        hydrOXYzine (ATARAX) tablet 50 mg  50 mg Oral TID PRN Lana Syed MD        [START ON 2021] PARoxetine (PAXIL) tablet 20 mg  20 mg Oral Daily Lana Syed MD        PARoxetine (PAXIL) tablet 10 mg 10 mg Oral Once Megan Storm MD        buprenorphine-naloxone (SUBOXONE) 2-0.5 MG SL film 1 Film  1 Film Sublingual QPM Megan Storm MD   1 Film at 07/19/21 1827    0.9 % sodium chloride infusion  25 mL Intravenous PRN BRADLEY Davila MD        oxyCODONE (ROXICODONE) immediate release tablet 5 mg  5 mg Oral Q4H PRN RBADLEY Davila MD   5 mg at 07/19/21 1857    doxycycline hyclate (VIBRA-TABS) tablet 100 mg  100 mg Oral 2 times per day Abdulaziz Cortes MD   100 mg at 07/20/21 0912    prochlorperazine (COMPAZINE) injection 10 mg  10 mg Intravenous Q6H PRN Megan Storm MD        buprenorphine-naloxone (SUBOXONE) 8-2 MG SL film 1 Film  1 Film Sublingual Daily Julien Jo MD   1 Film at 07/20/21 0912    sodium chloride flush 0.9 % injection 5-40 mL  5-40 mL Intravenous 2 times per day Julien Jo MD   10 mL at 07/20/21 0912    sodium chloride flush 0.9 % injection 5-40 mL  5-40 mL Intravenous PRN Julien Jo MD        enoxaparin (LOVENOX) injection 40 mg  40 mg Subcutaneous Daily Julien Jo MD   40 mg at 07/19/21 1031    polyethylene glycol (GLYCOLAX) packet 17 g  17 g Oral Daily PRN Julien Jo MD        acetaminophen (TYLENOL) tablet 650 mg  650 mg Oral Q6H PRN Julien Jo MD   650 mg at 07/20/21 0801    Or    acetaminophen (TYLENOL) suppository 650 mg  650 mg Rectal Q6H PRN Julien Jo MD           Objective:     Telemetry monitor: SB    Physical Exam:  Constitutional and general appearance: alert, cooperative, no distress and appears stated age  HEENT: PERRL, no cervical lymphadenopathy. No masses palpable.  Normal oral mucosa  Respiratory:  · Normal excursion and expansion without use of accessory muscles  · Resp auscultation: Normal breath sounds without wheezing, rhonchi, and rales  Cardiovascular:  · The apical impulse is not displaced  · Heart tones are crisp and normal. regular S1 and S2.  · Jugular venous pulsation Normal  · The carotid upstroke is normal in amplitude and contour without delay or bruit  · Peripheral pulses are symmetrical and full   Chest wall:  · Left upper chest incision open to air, no drainage, no ecchymosis, small amount of swelling over device pocket   Abdomen:  · No masses or tenderness  · Bowel sounds present  Extremities:  ·  No cyanosis or clubbing  ·  No lower extremity edema  ·  Skin: warm and dry  Neurological:  · Alert and oriented  · Moves all extremities well  · No abnormalities of mood, affect, memory, mentation, or behavior are noted    Data  EKG 7/16/2021:  NSR 70 bpm        Echo 1/10/2018:   Summary   Normal LV systolic function with an estimated EF of 55%. Normal left ventricular diastolic filling pressure. The mitral valve leaflets appear mildly myxomatous. There is mild thickening of TV and possible echodensity seen on certain   views. Cannot r/o vegetation and recommend AI if clinically indicated. Trivial tricuspid regurgitation and mild pulmonic regurgitation   Systolic pulmonary artery pressure (SPAP) is normal and estimated at 29mmHg   (RA pressure 15mmHg). All labs and testing reviewed.   Lab Review     Renal Profile:   Lab Results   Component Value Date    CREATININE 0.8 07/20/2021    BUN 16 07/20/2021     07/20/2021    K 4.5 07/20/2021     07/20/2021    CO2 27 07/20/2021     CBC:    Lab Results   Component Value Date    WBC 7.4 07/20/2021    RBC 5.38 07/20/2021    HGB 15.5 07/20/2021    HCT 45.5 07/20/2021    MCV 84.6 07/20/2021    RDW 14.9 07/20/2021     07/20/2021     BNP:  No results found for: BNP  Fasting Lipid Panel:  No results found for: CHOL, HDL, TRIG  Cardiac Enzymes:  CK/MbTroponin  Lab Results   Component Value Date    TROPONINI <0.01 07/16/2021     PT/ INR   Lab Results   Component Value Date    INR 1.23 01/09/2018    PROTIME 13.9 01/09/2018     PTT No results found for: PTT   Lab Results   Component Value Date    MG 2.50

## 2021-07-20 NOTE — FLOWSHEET NOTE
Bedside report given by Lena Ochoa, Skin assessment completed. No significant events over last shift. Shift assessment completed and documented; see flowsheets for details and Pt not vented. Pt resting in bed, VSS, Lines checked and verified, alarms on and audible. Repositioned patient, sacral Mepilex in place, call light within reach, will continue to closely monitor. Recent Labs     07/20/21  0432   WBC 7.4   HGB 15.5   HCT 45.5   MCV 84.6        Recent Labs     07/20/21  0433      K 4.5      CO2 27   GLUCOSE 94   BUN 16   CREATININE 0.8*   CALCIUM 9.5   LABGLOM >60   GFRAA >60     Coral Omer, RN 7/20/2021 10:25 AM  Estimated Creatinine Clearance: 147 mL/min (A) (based on SCr of 0.8 mg/dL (L)). DVT Prophylaxis: enoxaparin (Lovenox) 40 mg SQ Once daily.  Held Per EP    GI Prophylaxis: None Ordered

## 2021-07-21 ENCOUNTER — APPOINTMENT (OUTPATIENT)
Dept: GENERAL RADIOLOGY | Age: 31
DRG: 171 | End: 2021-07-21
Payer: COMMERCIAL

## 2021-07-21 ENCOUNTER — PROCEDURE VISIT (OUTPATIENT)
Dept: CARDIOLOGY CLINIC | Age: 31
End: 2021-07-21
Payer: COMMERCIAL

## 2021-07-21 VITALS
TEMPERATURE: 98.2 F | HEIGHT: 72 IN | WEIGHT: 170.19 LBS | SYSTOLIC BLOOD PRESSURE: 108 MMHG | HEART RATE: 64 BPM | RESPIRATION RATE: 17 BRPM | BODY MASS INDEX: 23.05 KG/M2 | OXYGEN SATURATION: 96 % | DIASTOLIC BLOOD PRESSURE: 63 MMHG

## 2021-07-21 DIAGNOSIS — Z95.0 PACEMAKER: ICD-10-CM

## 2021-07-21 LAB
ANION GAP SERPL CALCULATED.3IONS-SCNC: 10 MMOL/L (ref 3–16)
BUN BLDV-MCNC: 26 MG/DL (ref 7–20)
CALCIUM SERPL-MCNC: 9.3 MG/DL (ref 8.3–10.6)
CHLORIDE BLD-SCNC: 101 MMOL/L (ref 99–110)
CO2: 27 MMOL/L (ref 21–32)
CREAT SERPL-MCNC: 0.9 MG/DL (ref 0.9–1.3)
GFR AFRICAN AMERICAN: >60
GFR NON-AFRICAN AMERICAN: >60
GLUCOSE BLD-MCNC: 86 MG/DL (ref 70–99)
HCT VFR BLD CALC: 45.2 % (ref 40.5–52.5)
HEMOGLOBIN: 15.3 G/DL (ref 13.5–17.5)
MCH RBC QN AUTO: 28.9 PG (ref 26–34)
MCHC RBC AUTO-ENTMCNC: 33.9 G/DL (ref 31–36)
MCV RBC AUTO: 85.3 FL (ref 80–100)
PDW BLD-RTO: 14.4 % (ref 12.4–15.4)
PLATELET # BLD: 177 K/UL (ref 135–450)
PMV BLD AUTO: 8.1 FL (ref 5–10.5)
POTASSIUM REFLEX MAGNESIUM: 4.5 MMOL/L (ref 3.5–5.1)
RBC # BLD: 5.29 M/UL (ref 4.2–5.9)
SODIUM BLD-SCNC: 138 MMOL/L (ref 136–145)
WBC # BLD: 6.7 K/UL (ref 4–11)

## 2021-07-21 PROCEDURE — 80048 BASIC METABOLIC PNL TOTAL CA: CPT

## 2021-07-21 PROCEDURE — 71046 X-RAY EXAM CHEST 2 VIEWS: CPT

## 2021-07-21 PROCEDURE — 6360000002 HC RX W HCPCS: Performed by: INTERNAL MEDICINE

## 2021-07-21 PROCEDURE — 36415 COLL VENOUS BLD VENIPUNCTURE: CPT

## 2021-07-21 PROCEDURE — 6370000000 HC RX 637 (ALT 250 FOR IP): Performed by: INTERNAL MEDICINE

## 2021-07-21 PROCEDURE — 2580000003 HC RX 258: Performed by: INTERNAL MEDICINE

## 2021-07-21 PROCEDURE — 85027 COMPLETE CBC AUTOMATED: CPT

## 2021-07-21 PROCEDURE — 99232 SBSQ HOSP IP/OBS MODERATE 35: CPT | Performed by: NURSE PRACTITIONER

## 2021-07-21 RX ORDER — DOXYCYCLINE HYCLATE 100 MG
100 TABLET ORAL EVERY 12 HOURS SCHEDULED
Qty: 10 TABLET | Refills: 0 | Status: SHIPPED | OUTPATIENT
Start: 2021-07-21 | End: 2021-07-26

## 2021-07-21 RX ADMIN — ENOXAPARIN SODIUM 40 MG: 40 INJECTION SUBCUTANEOUS at 08:34

## 2021-07-21 RX ADMIN — ACETAMINOPHEN 650 MG: 325 TABLET ORAL at 12:08

## 2021-07-21 RX ADMIN — OXYCODONE 5 MG: 5 TABLET ORAL at 08:33

## 2021-07-21 RX ADMIN — PAROXETINE HYDROCHLORIDE 20 MG: 20 TABLET, FILM COATED ORAL at 08:33

## 2021-07-21 RX ADMIN — DOXYCYCLINE HYCLATE 100 MG: 100 TABLET, COATED ORAL at 08:33

## 2021-07-21 RX ADMIN — ACETAMINOPHEN 650 MG: 325 TABLET ORAL at 04:56

## 2021-07-21 RX ADMIN — MUPIROCIN: 20 OINTMENT TOPICAL at 08:34

## 2021-07-21 RX ADMIN — Medication 10 ML: at 08:34

## 2021-07-21 RX ADMIN — BUPRENORPHINE AND NALOXONE 1 FILM: 8; 2 FILM BUCCAL; SUBLINGUAL at 08:33

## 2021-07-21 RX ADMIN — HYDROXYZINE HYDROCHLORIDE 50 MG: 10 TABLET ORAL at 08:33

## 2021-07-21 ASSESSMENT — PAIN DESCRIPTION - PAIN TYPE
TYPE: SURGICAL PAIN;ACUTE PAIN
TYPE: ACUTE PAIN;SURGICAL PAIN
TYPE: SURGICAL PAIN

## 2021-07-21 ASSESSMENT — PAIN SCALES - GENERAL
PAINLEVEL_OUTOF10: 5
PAINLEVEL_OUTOF10: 0
PAINLEVEL_OUTOF10: 7
PAINLEVEL_OUTOF10: 0
PAINLEVEL_OUTOF10: 7

## 2021-07-21 ASSESSMENT — PAIN DESCRIPTION - DESCRIPTORS
DESCRIPTORS: ACHING;SORE
DESCRIPTORS: ACHING;SORE;THROBBING

## 2021-07-21 ASSESSMENT — PAIN DESCRIPTION - ORIENTATION
ORIENTATION: LEFT
ORIENTATION: LEFT

## 2021-07-21 ASSESSMENT — PAIN DESCRIPTION - LOCATION
LOCATION: CHEST
LOCATION: CHEST

## 2021-07-21 ASSESSMENT — PAIN DESCRIPTION - FREQUENCY
FREQUENCY: CONTINUOUS
FREQUENCY: CONTINUOUS

## 2021-07-21 NOTE — FLOWSHEET NOTE
07/20/21 2000   Assessment   Charting Type Shift assessment   Neurological   Neuro (WDL) WDL   Level of Consciousness Alert (0)   Orientation Level Oriented X4   Cognition Appropriate judgement; Appropriate safety awareness; Appropriate attention/concentration; Follows commands; Appropriate for developmental age   Language Clear; Appropriate for developmental age   [de-identified] Coma Scale   Eye Opening 4   Best Verbal Response 5   Best Motor Response 6   [place] Coma Scale Score 15   HEENT   HEENT (WDL) WDL   Respiratory   Respiratory (WDL) WDL   Respiratory Pattern Regular   Respiratory Depth Normal   Respiratory Quality/Effort Unlabored   Chest Assessment Trachea midline; Chest expansion symmetrical   L Breath Sounds Clear   R Breath Sounds Clear   Cardiac   Cardiac (WDL) X   Cardiac Regularity Regular   Heart Sounds S1, S2   Cardiac Rhythm A-V Sequential Paced   Rhythm Interpretation   Pulse 60   Cardiac Monitor   Bedside Cardiac Monitor On Yes   Bedside Cardiac Audible Yes   Bedside Cardiac Alarms Set Yes   Bedside Monitor Alarm Parameters    Telemetry Box Number ICU Bedside Monitor   Pacemaker   Pacemaker Type Permanent   Pulse Generated Yes   Pacer Mode VVI   Pacemaker Location Left chest   Temporary Wires Epicardial   Gastrointestinal   Abdominal (WDL) WDL   GI Symptoms Loss of appetite   Abdomen Inspection Soft;Flat   RUQ Bowel Sounds Active   LUQ Bowel Sounds Active   RLQ Bowel Sounds Active   LLQ Bowel Sounds Active   Peripheral Vascular   Peripheral Vascular (WDL) WDL   Edema None   Skin Color/Condition   Skin Color/Condition (WDL) WDL   Skin Integrity   Skin Integrity (WDL) WDL   Musculoskeletal   Musculoskeletal (WDL) WDL   Genitourinary   Genitourinary (WDL) WDL   Flank Tenderness No   Suprapubic Tenderness No   Dysuria No   Urine Assessment   Incontinence No   Urine Color Cathi   Urine Appearance Clear   Urine Odor No odor   Anus/Rectum   Anus/Rectum (WDL) WDL  (per pt)   Psychosocial   Psychosocial (WDL) WDL   Patient Behaviors Cooperative;Calm; Anxious

## 2021-07-21 NOTE — PLAN OF CARE
Problem: Coping:  Goal: Ability to cope will improve  Description: Ability to cope will improve  Outcome: Ongoing  Goal: Ability to identify appropriate support needs will improve  Description: Ability to identify appropriate support needs will improve  Outcome: Ongoing     Problem: Health Behavior:  Goal: Ability to manage health-related needs will improve  Description: Ability to manage health-related needs will improve  Outcome: Ongoing     Problem: Safety:  Goal: Ability to remain free from injury will improve  Description: Ability to remain free from injury will improve  Outcome: Ongoing     Problem: Self-Concept:  Goal: Level of anxiety will decrease  Description: Level of anxiety will decrease  Outcome: Ongoing  Goal: Ability to verbalize feelings about condition will improve  Description: Ability to verbalize feelings about condition will improve  Outcome: Ongoing     Problem: Nutrition  Goal: Optimal nutrition therapy  Outcome: Ongoing     Problem: Falls - Risk of:  Goal: Will remain free from falls  Description: Will remain free from falls  Outcome: Ongoing  Goal: Absence of physical injury  Description: Absence of physical injury  Outcome: Ongoing     Problem: Pain:  Goal: Pain level will decrease  Description: Pain level will decrease  Outcome: Ongoing  Goal: Control of acute pain  Description: Control of acute pain  Outcome: Ongoing  Goal: Control of chronic pain  Description: Control of chronic pain  Outcome: Ongoing

## 2021-07-21 NOTE — PROGRESS NOTES
[employer] Dual Ch. Pacemaker RA lead revision using existing RA lead by Dr. Keely Vazquez 7/20/21 - implant record. [facility] inpatient one day post op s/p RA lead revision rep check. AP 82.3%  <0.1%  No arrhythmias recorded. All pacing and sensing lead diagnostics are within normal limits. No changes made. See Paceart report under the Cardiology tab.

## 2021-07-21 NOTE — CARE COORDINATION
CASE MANAGEMENT DISCHARGE SUMMARY      Discharge to: home    Transportation:    Family/car:    Confirmed discharge plan with:     Patient: yes    RN, name: Haritha Graves    Note: Discharging nurse to complete JAMIL, reconcile AVS, and place final copy with patient's discharge packet. RN to ensure that written prescriptions for  Level II medications are sent with patient to the facility as per protocol.

## 2021-07-21 NOTE — PROGRESS NOTES
[facility]     Electrophysiology                                     Progress Note    Admission date:  2021    Reason for follow up visit: sinus pause/syncope     HPI/CC: Roxanna Arredondo was admitted on 2021 after a 19 second pause was noted on outpatient cardiac event monitor. Monitor was initially ordered on 2021 for syncope. Cardiology office was urgently notified of multiple pauses and heart rates in the 30's. Neurology is following for possible seizures versus syncope. Telemetry review showed multiple lengthy consecutive pauses on 2021 around 1700. Unclear if patient had any loss of consciousness/seizure activity with this. On 2021, he underwent dual chamber pacemaker. On 2021, device interrogation and CXR confirmed RA lead dislodgement and he underwent RA lead revision. Rhythm has been AP VS.     Subjective: Complains of tenderness over implant site. Denies chest pain, palpitations, shortness of breath, and dizziness. Vitals:  Blood pressure 119/88, pulse 62, temperature 97.9 °F (36.6 °C), temperature source Axillary, resp. rate 15, height 6' (1.829 m), weight 170 lb 3.1 oz (77.2 kg), SpO2 96 %.   Temp  Av.8 °F (36.6 °C)  Min: 97.5 °F (36.4 °C)  Max: 98 °F (36.7 °C)  Pulse  Av.4  Min: 40  Max: 75  BP  Min: 96/57  Max: 122/59  SpO2  Av %  Min: 94 %  Max: 98 %    24 hour I/O    Intake/Output Summary (Last 24 hours) at 2021 0901  Last data filed at 2021 0815  Gross per 24 hour   Intake 240 ml   Output 1050 ml   Net -810 ml     Current Facility-Administered Medications   Medication Dose Route Frequency Provider Last Rate Last Admin    mupirocin (BACTROBAN) 2 % ointment   Nasal BID Jareth Cardona MD   Given at 21 0834    hydrOXYzine (ATARAX) tablet 50 mg  50 mg Oral TID PRN Jareth Cardona MD   50 mg at 21 0833    PARoxetine (PAXIL) tablet 20 mg  20 mg Oral Daily Jareth Cardona MD   20 mg at 21 5722    buprenorphine-naloxone (SUBOXONE) 2-0.5 MG SL film 1 Film  1 Film Sublingual QPM Angel Rendon MD   1 Film at 07/20/21 1953    0.9 % sodium chloride infusion  25 mL Intravenous PRN Oscar Goodwin MD        oxyCODONE (ROXICODONE) immediate release tablet 5 mg  5 mg Oral Q4H PRN BRADLEY Hale MD   5 mg at 07/21/21 5911    doxycycline hyclate (VIBRA-TABS) tablet 100 mg  100 mg Oral 2 times per day Oscar Goodwin MD   100 mg at 07/21/21 5077    prochlorperazine (COMPAZINE) injection 10 mg  10 mg Intravenous Q6H PRN Angel Rendon MD        buprenorphine-naloxone (SUBOXONE) 8-2 MG SL film 1 Film  1 Film Sublingual Daily Colt Yanes MD   1 Film at 07/21/21 2418    sodium chloride flush 0.9 % injection 5-40 mL  5-40 mL Intravenous 2 times per day Colt Yanes MD   10 mL at 07/21/21 0834    sodium chloride flush 0.9 % injection 5-40 mL  5-40 mL Intravenous PRN Colt Yanes MD        enoxaparin (LOVENOX) injection 40 mg  40 mg Subcutaneous Daily Colt Yanes MD   40 mg at 07/21/21 0834    polyethylene glycol (GLYCOLAX) packet 17 g  17 g Oral Daily PRN Colt Yanes MD        acetaminophen (TYLENOL) tablet 650 mg  650 mg Oral Q6H PRN Colt Yanes MD   650 mg at 07/21/21 0456    Or    acetaminophen (TYLENOL) suppository 650 mg  650 mg Rectal Q6H PRN Colt Yanes MD           Objective:     Telemetry monitor: AP VS    Physical Exam:  Constitutional and general appearance: alert, cooperative, no distress and appears stated age  HEENT: PERRL, no cervical lymphadenopathy. No masses palpable.  Normal oral mucosa  Respiratory:  · Normal excursion and expansion without use of accessory muscles  · Resp auscultation: Normal breath sounds without wheezing, rhonchi, and rales  Cardiovascular:  · The apical impulse is not displaced  · Heart tones are crisp and normal. regular S1 and S2.  · Jugular venous pulsation Normal  · The carotid upstroke is normal in amplitude and contour without delay or bruit  · Peripheral pulses are symmetrical and full   Chest wall:  · Left upper chest incision open to air, no drainage, no ecchymosis, small amount of swelling over device pocket   Abdomen:  · No masses or tenderness  · Bowel sounds present  Extremities:  ·  No cyanosis or clubbing  ·  No lower extremity edema  ·  Skin: warm and dry  Neurological:  · Alert and oriented  · Moves all extremities well  · No abnormalities of mood, affect, memory, mentation, or behavior are noted    Data  EKG 7/16/2021:  NSR 70 bpm        Echo 1/10/2018:   Summary   Normal LV systolic function with an estimated EF of 55%. Normal left ventricular diastolic filling pressure. The mitral valve leaflets appear mildly myxomatous. There is mild thickening of TV and possible echodensity seen on certain   views. Cannot r/o vegetation and recommend AI if clinically indicated. Trivial tricuspid regurgitation and mild pulmonic regurgitation   Systolic pulmonary artery pressure (SPAP) is normal and estimated at 29mmHg   (RA pressure 15mmHg). All labs and testing reviewed.   Lab Review     Renal Profile:   Lab Results   Component Value Date    CREATININE 0.9 07/21/2021    BUN 26 07/21/2021     07/21/2021    K 4.5 07/21/2021     07/21/2021    CO2 27 07/21/2021     CBC:    Lab Results   Component Value Date    WBC 6.7 07/21/2021    RBC 5.29 07/21/2021    HGB 15.3 07/21/2021    HCT 45.2 07/21/2021    MCV 85.3 07/21/2021    RDW 14.4 07/21/2021     07/21/2021     BNP:  No results found for: BNP  Fasting Lipid Panel:  No results found for: CHOL, HDL, TRIG  Cardiac Enzymes:  CK/MbTroponin  Lab Results   Component Value Date    TROPONINI <0.01 07/16/2021     PT/ INR   Lab Results   Component Value Date    INR 1.23 01/09/2018    PROTIME 13.9 01/09/2018     PTT No results found for: PTT   Lab Results   Component Value Date    MG 2.50 07/16/2021    No results found for: TSH    Assessment:  Sinus pause/sinus bradycardia: resolved, stable    -TSH normal 7/2021   -s/p dual chamber pacemaker implant 7/19/2021 (Medtronic)   Syncope vs seizures: recurrent now stable    -events started 4/2021   -neurology following   -tilt table testing negative 7/19/2021  History of heroin abuse   -long term suboxone use (2018)  Cannabis use:   -UDS positive 7/2021  Tobacco abuse: smoking cessation is recommended  Hepatitis C  Hyperbilirubinemia     Plan:   Doxycycline 100 mg PO BID x 5 days post device implant   Post procedure instructions reviewed. Device check on 7/27/2021  Follow up in office on 8/30/2021  Okay for discharge from an EP standpoint     CXR and device interrogation reviewed with Dr. Ting Huerta     Patient was seen outside of global device window for syncope.      DESMOND Muse-CNP  [address]  [phone]

## 2021-07-21 NOTE — PROGRESS NOTES
[id number] Shift Summary    2340-0545: Patient transported via wheelchair to radiology. 1810-9095: Assessment completed, vital signs noted and medication administered per order. See flow sheet for details. Patient is alert and oriented with complaints of pain at incision site. Oxycodone administered per order. Patient is AV paced with rate 62. Denies chest pain, shortness of breath or nausea. Prn atarax administered for patient relief of anxiety. 1137: received a call from Shania with Electrophysiology. States patient is ok to discharge from their stand point. 1144: Neurology contacted for ok to discharge. Ok to discharge per Dr Sarita Fabian with neurology. Attending notified via [employer]. 1200: Assessment completed with no acute changes noted. 0292-2185: IV's removed, discharge instructions explained. All questions asked were answered. Patient voiced understanding. Assisted patient with getting dressed awaiting mother for discharge. 1450: Transported patient to family vehicle for discharge to home via wheel chair.

## 2021-07-21 NOTE — DISCHARGE SUMMARY
Hospital Medicine Discharge Summary    Patient: Yadira Rios     Gender: male  : 1990   Age: 28 y.o. MRN: [id number]    Admitting Physician: Shanelle Tirado MD  Discharge Physician: Arnol Soto MD     Code Status: Full Code     Admit Date: 2021   Discharge Date:   21    Disposition:  Home    Discharge Diagnoses:  Sinus pause/sinus bradycardia: resolved, stable-  -s/p dual chamber pacemaker implant 2021 ([employer])   Syncope vs seizures: recurrent now stable -neurology following. Negative tilt table test result. History of heroin abuse on Suboxone therapy. Cannabis use/ Tobacco abuse: smoking cessation is recommended  Hepatitis C  Hyperbilirubinemia       Follow-up appointments:   2021    Outpatient to do list: Arrange follow up in cardiology office on 2021    Condition at Discharge:  Sierra Almanzaro Johnie Course:   Yadira Rios  is a 26-year-old male with a history of drug abuse. He was admitted for seizure-like activity that was witnessed by his mother initially on 21 was on the toilet . He was initially managed as a seizure disorder with Keppra but further evaluation on 21 showed a bradycardia for which she was seen by cardiology on 21. He had a 2D echo and a 30-day event recorder was provided. He ended up with a dual-chamber permanent pacemaker implanted on. He had a lead malfunction and the pacemaker was revised on  and he was discharged on 21 follow-up as an outpatient with cardiology for device check on 21. He was provided with 5 days of oral doxycycline post permanent pacemaker implantation.         Discharge Medications:   Discharge Medication List as of 2021  1:32 PM      START taking these medications    Details   doxycycline hyclate (VIBRA-TABS) 100 MG tablet Take 1 tablet by mouth every 12 hours for 5 days, Disp-10 tablet, R-0Normal           Discharge Medication List as of 2021  1:32 PM Discharge Medication List as of 7/21/2021  1:32 PM      CONTINUE these medications which have NOT CHANGED    Details   levETIRAcetam (KEPPRA) 500 MG tablet Take 1 tablet by mouth 2 times daily, Disp-60 tablet, R-1Normal      buprenorphine-naloxone (SUBOXONE) 8-2 MG FILM SL film Place 1 Film under the tongue daily. Historical Med           Discharge Medication List as of 7/21/2021  1:32 PM          Discharge ROS:  A complete review of systems was asked and negative except for above     Discharge Exam:    /63   Pulse 64   Temp 98.2 °F (36.8 °C) (Oral)   Resp 17   Ht 6' (1.829 m)   Wt 170 lb 3.1 oz (77.2 kg)   SpO2 96%   BMI 23.08 kg/m²   General appearance:  NAD  HEENT:   Normal cephalic, atraumatic, moist mucous membranes, no oropharyngeal erythema or exudate  Neck: Supple, trachea midline, no anterior cervical or SC LAD  Heart[de-identified] Normal s1/s2, RRR, no murmurs, gallops, or rubs. no leg edema  Lungs: Clear to auscultation bilaterally  Abdomen: Soft, non-tender, non-distended, bowel sounds present, no masses  Musculoskeletal:  No clubbing, no cyanosis, no edema  Skin: No lesion or masses  Neurologic:  Neurovascularly intact without any focal sensory/motor deficits. Cranial nerves: II-XII intact, grossly non-focal.  Psychiatric:  A & O x3  Neuro: Grossly intact, moves all four extremities     Labs:  For convenience and continuity at follow-up the following most recent labs are provided:    Lab Results   Component Value Date    WBC 6.7 07/21/2021    HGB 15.3 07/21/2021    HCT 45.2 07/21/2021    MCV 85.3 07/21/2021     07/21/2021     07/21/2021    K 4.5 07/21/2021     07/21/2021    CO2 27 07/21/2021    BUN 26 07/21/2021    CREATININE 0.9 07/21/2021    CALCIUM 9.3 07/21/2021    ALKPHOS 63 07/16/2021    ALT 87 07/16/2021    AST 66 07/16/2021    BILITOT 1.3 07/16/2021    LABALBU 5.1 07/16/2021     Lab Results   Component Value Date    INR 1.23 (H) 01/09/2018       Radiology:  ECHO Complete 2D W Doppler W Color    Result Date: 7/19/2021  Transthoracic Echocardiography Report (TTE)  Demographics   Patient Name       Hien Peng   Date of Study      07/19/2021         Gender              Male   Patient Number     [id number]         Date of Birth       1990   Visit Number       [id number]          Age                 28 year(s)   Accession Number   [id number]         Room Number         7187   Corporate ID       [id number]             Sonographer         Adin Sparrow [facility]   Ordering Physician Lele Ferrer., MD             Physician           Aurelia Salas MD  Procedure Type of Study   TTE procedure:ECHOCARDIOGRAM COMPLETE 2D W DOPPLER W COLOR. Procedure Date Date: 07/19/2021 Start: 10:50 AM Study Location: [address] [place] [address] - Echo Lab Technical Quality: Adequate visualization Indications:Abnormal ECG. Patient Status: Routine Height: 72 inches Weight: 166 pounds BSA: 1.97 m2 BMI: 22.51 kg/m2 BP: 110/70 mmHg  Conclusions   Summary  Normal left ventricle systolic function with an estimated ejection fraction  of 55%. No regional wall motion abnormalities are seen. Normal left ventricular diastolic filling pressure. Mild thickening of anterior leaflet of mitral valve. Mild mitral regurgitation. The tricuspid valve is normal in structure and function. Trace tricuspid regurgitation. The right ventricle is upper normal size to mildly enlarged with low normal  function. Borderline redundant/aneurysmal interatrial septum  Systolic pulmonary artery pressure (SPAP) is normal and estimated at 25 mmHg  (right atrial pressure 3 mmHg). Bradycardia throughout the study (heart rate 45-50 BPM).    Signature   ------------------------------------------------------------------  Electronically signed by Aurelia Salas MD (Interpreting  physician) on 07/19/2021 at 11:46 AM  ------------------------------------------------------------------ Measurements   AV Peak Velocity: 132 cm/s     MV Peak E-Wave: 78 cm/s  AV Peak Gradient: 6.97 mmHg    MV Peak A-Wave: 49.4 cm/s  LVOT Peak Velocity: 80.5 cm/s  MV E/A Ratio: 1.58   TR Velocity:236 cm/s  TR Gradient:22.28 mmHg  Estimated RAP:3 mmHg  Estimated RVSP: 25 mmHg  E' Septal Velocity: 14.1 cm/s  E' Lateral Velocity: 18.5 cm/s   Aortic Valve   Peak Velocity: 132 cm/s  Peak Gradient: 6.97 mmHg   Cusp Separation: 1.8 cm  Aorta   Aortic Root: 3.1 cm      XR CHEST (2 VW)    Result Date: 7/21/2021  EXAMINATION: TWO XRAY VIEWS OF THE CHEST 7/21/2021 7:56 am COMPARISON: 07/20/2021 HISTORY: ORDERING SYSTEM PROVIDED HISTORY: pacemaker RA lead revision TECHNOLOGIST PROVIDED HISTORY: Reason for exam:->pacemaker RA lead revision Reason for Exam: pacemaker revision FINDINGS: The lungs are clear. The cardiac silhouette is within normal limits status post dual lead pacemaker. The leads are intact overlying right ventricle and now right atrium. There is no pneumothorax or pleural effusion. 1. Status post uncomplicated dual lead pacemaker. XR CHEST (2 VW)    Result Date: 7/20/2021  EXAMINATION: TWO XRAY VIEWS OF THE CHEST 7/20/2021 8:42 am COMPARISON: None. HISTORY: ORDERING SYSTEM PROVIDED HISTORY: pacemaker TECHNOLOGIST PROVIDED HISTORY: Reason for exam:->pacemaker Reason for Exam: post pacemaker FINDINGS: The lungs are clear. The mediastinum and cardiac silhouette are unremarkable. No acute abnormality. CT head without contrast    Result Date: 7/3/2021  EXAMINATION: CT OF THE HEAD WITHOUT CONTRAST  7/3/2021 8:30 am TECHNIQUE: CT of the head was performed without the administration of intravenous contrast. Dose modulation, iterative reconstruction, and/or weight based adjustment of the mA/kV was utilized to reduce the radiation dose to as low as reasonably achievable. COMPARISON: None.  HISTORY: ORDERING SYSTEM PROVIDED HISTORY: seizure TECHNOLOGIST PROVIDED HISTORY: Reason for exam:->seizure Has a \"code stroke\" or \"stroke alert\" been called? ->No Decision Support Exception - unselect if not a suspected or confirmed emergency medical condition->Emergency Medical Condition (MA) Reason for Exam: pt had 2 seizures this morning,no head injury Acuity: Acute Type of Exam: Initial Relevant Medical/Surgical History: 6 seizures since May 2021,not on any medication for seizures FINDINGS: BRAIN/VENTRICLES: There is no acute intracranial hemorrhage, mass effect or midline shift. No abnormal extra-axial fluid collection. The gray-white differentiation is maintained without evidence of an acute infarct. There is no evidence of hydrocephalus. ORBITS: The visualized portion of the orbits demonstrate no acute abnormality. SINUSES: Trace mucosal thickening is seen in the ethmoid air cells and sphenoid. SOFT TISSUES/SKULL:  No acute abnormality of the visualized skull or soft tissues. No acute intracranial abnormality. XR CHEST PORTABLE    Result Date: 7/16/2021  EXAMINATION: ONE XRAY VIEW OF THE CHEST 7/16/2021 8:16 pm COMPARISON: None. HISTORY: ORDERING SYSTEM PROVIDED HISTORY: near syncope TECHNOLOGIST PROVIDED HISTORY: Reason for exam:->near syncope Reason for Exam: near syncope Acuity: Acute Type of Exam: Initial FINDINGS: The cardiomediastinal silhouette is unremarkable. The lungs clear. No infiltrate, pleural fluid or focal process is identified. No acute osseous findings. No acute cardiopulmonary disease. EKG     Rhythm: A paced and V sensed rhythm  Rate: 50-60  Clinical Impression: no acute changes        The patient was seen and examined on day of discharge and this discharge summary is in conjunction with any daily progress note from day of discharge. Time Spent on discharge is 30 minutes  in the examination, evaluation, counseling and review of medications and discharge plan.       Note that more than 30 minutes was spent in preparing discharge papers, discussing discharge with patient, medication review, etc.       Signed:    Owen Su MD   7/21/2021      Thank you DESMOND Hurd CNP for the opportunity to be involved in this patient's care.  If you have any questions or concerns please feel free to contact me at [facility]

## 2021-07-21 NOTE — FLOWSHEET NOTE
07/21/21 0000   Assessment   Charting Type Reassessment   Reassessment Performed Changes documented below   Neurological   Neuro (WDL) WDL   Level of Consciousness Alert (0)   Orientation Level Oriented X4   Cognition Appropriate judgement; Appropriate safety awareness; Appropriate attention/concentration; Follows commands; Appropriate for developmental age   Language Clear; Appropriate for developmental age   [de-identified] Coma Scale   Eye Opening 4   Best Verbal Response 5   Best Motor Response 6   [place] Coma Scale Score 15   HEENT   HEENT (WDL) WDL   Respiratory   Respiratory (WDL) WDL   Respiratory Pattern Regular   Respiratory Depth Normal   Respiratory Quality/Effort Unlabored   Chest Assessment Trachea midline; Chest expansion symmetrical   L Breath Sounds Clear   R Breath Sounds Clear   Cardiac   Cardiac (WDL) X   Cardiac Regularity Regular   Heart Sounds S1, S2   Cardiac Rhythm A-V Sequential Paced   Rhythm Interpretation   Pulse 60   Cardiac Monitor   Bedside Cardiac Monitor On Yes   Bedside Cardiac Audible Yes   Bedside Cardiac Alarms Set Yes   Bedside Monitor Alarm Parameters    Telemetry Box Number ICU Bedside Monitor   Pacemaker   Pacemaker Type Permanent   Pacer Mode VVI   Temporary Wires Epicardial   Gastrointestinal   Abdominal (WDL) WDL   GI Symptoms Loss of appetite   Abdomen Inspection Soft;Flat   RUQ Bowel Sounds Active   LUQ Bowel Sounds Active   RLQ Bowel Sounds Active   LLQ Bowel Sounds Active   Peripheral Vascular   Peripheral Vascular (WDL) WDL   Edema None   Skin Color/Condition   Skin Color/Condition (WDL) WDL   Skin Integrity   Skin Integrity (WDL) WDL   Musculoskeletal   Musculoskeletal (WDL) WDL   Genitourinary   Genitourinary (WDL) WDL   Flank Tenderness No   Suprapubic Tenderness No   Dysuria No   Urine Assessment   Incontinence No   Urine Color Cathi   Urine Appearance Clear   Urine Odor No odor   Anus/Rectum   Anus/Rectum (WDL) WDL  (per pt)   Psychosocial   Psychosocial (WDL) WDL Patient Behaviors Cooperative;Calm; Anxious

## 2021-07-21 NOTE — FLOWSHEET NOTE
07/21/21 0400   Assessment   Charting Type Reassessment   Reassessment Performed Changes documented below   Neurological   Neuro (WDL) WDL   Level of Consciousness Alert (0)   Orientation Level Oriented X4   Cognition Appropriate judgement; Appropriate safety awareness; Appropriate attention/concentration; Follows commands; Appropriate for developmental age   Language Clear; Appropriate for developmental age   [de-identified]   HEENT (WDL) WDL   Respiratory   Respiratory (WDL) WDL   Respiratory Pattern Regular   Respiratory Depth Normal   Respiratory Quality/Effort Unlabored   Chest Assessment Trachea midline; Chest expansion symmetrical   L Breath Sounds Clear   R Breath Sounds Clear   Cardiac   Cardiac (WDL) X   Cardiac Regularity Regular   Heart Sounds S1, S2   Cardiac Rhythm A-V Sequential Paced   Rhythm Interpretation   Pulse 60   Cardiac Monitor   Bedside Cardiac Monitor On Yes   Bedside Cardiac Audible Yes   Bedside Cardiac Alarms Set Yes   Bedside Monitor Alarm Parameters    Telemetry Box Number ICU Bedside Monitor   Pacemaker   Pacemaker Type Permanent   Pacer Mode VVI   Temporary Wires Epicardial   Gastrointestinal   Abdominal (WDL) WDL   GI Symptoms Loss of appetite   Abdomen Inspection Soft;Flat   RUQ Bowel Sounds Active   LUQ Bowel Sounds Active   RLQ Bowel Sounds Active   LLQ Bowel Sounds Active   Peripheral Vascular   Peripheral Vascular (WDL) WDL   Edema None   Skin Color/Condition   Skin Color/Condition (WDL) WDL   Skin Integrity   Skin Integrity (WDL) WDL   Musculoskeletal   Musculoskeletal (WDL) WDL   Genitourinary   Genitourinary (WDL) WDL   Flank Tenderness No   Suprapubic Tenderness No   Dysuria No   Urine Assessment   Incontinence No   Urine Color Cathi   Urine Appearance Clear   Urine Odor No odor   Anus/Rectum   Anus/Rectum (WDL) WDL  (per pt)   Psychosocial   Psychosocial (WDL) WDL   Patient Behaviors Cooperative;Calm; Anxious

## 2021-07-22 ENCOUNTER — TELEPHONE (OUTPATIENT)
Dept: FAMILY MEDICINE CLINIC | Age: 31
End: 2021-07-22

## 2021-07-22 PROCEDURE — 93280 PM DEVICE PROGR EVAL DUAL: CPT | Performed by: INTERNAL MEDICINE

## 2021-07-22 PROCEDURE — 93272 ECG/REVIEW INTERPRET ONLY: CPT | Performed by: INTERNAL MEDICINE

## 2021-07-22 NOTE — TELEPHONE ENCOUNTER
[address] Transitions Initial Follow Up Call    Outreach made within 2 business days of discharge: Yes    Patient: Omero Lewis Patient : 1990   MRN: [id number]  Reason for Admission: There are no discharge diagnoses documented for the most recent discharge.   Discharge Date: 21       Spoke with: unable to leave voicemail    Discharge department/facility: [facility]    Scheduled appointment with PCP within 7-14 days    Follow Up  Future Appointments   Date Time Provider Tushar Sellers   2021  9:30 AM SCHEDULE, [facility]   2021  1:15 PM DESMOND Wayne CNPyle [facility]al [facility]   2021  7:10 AM SCHEDULE, [facility]   10/18/2021  9:15 AM SCHEDULE, [facility] Eligio Reveal [facility]   10/18/2021  9:15 AM DESMOND Wayne CNP [facility]       [place], [place]

## 2021-07-23 NOTE — PROCEDURES
[address]  Electrophysiology Report  Tilt Table Study    Date: 07/19/2021    1. Head up tilt table test  2. Isuprel challenge    Indication:    Procedure:   After obtaining informed consent patient was brought to the electrophysiology lab in fasting on sedated state. Patient's baseline blood pressure after lying supine for 5 minutes was measured. Patient was tilted to a 60 degree angle and maintained in that angle. No symptoms were noted. After 30 minutes of baseline tilt Isuprel was started at 1 mirograms/min for 5 minutes and then increased to 2 micrograms finally Isuprel increased to 3 micrograms/min. The BP and heart rate measurements were as follows    Baseline   Blood Pressure: 109/48  Baseline Heart rate: 48    During tilt off Isuprel  Blood pressure: 88/40  Heart rate: 41    During tilt on Isuprel  Blood pressure: 113/63   Heart rate: 100        Conclusion:  1. Orthostatic hypotension. Asymptomatic.     Erica Rojas MD  Cardiac Electrophysiology  [id number] [facility]  [phone] [facility]

## 2021-07-23 NOTE — ADT AUTH CERT
Cardiology [address] Day 5 (7/20/2021) by Mable Dennis RN       Review Status Review Entered   Completed 7/23/2021 13:53      Criteria Review      Care Day: 5 Care Date: 7/20/2021 Level of Care: Intermediate Care    Guideline Day 2    Level Of Care    ( ) Floor    7/23/2021 1:52 PM EDT by Burnard Dakin      intermediate care    Clinical Status    ( ) * No ICU or intermediate care needs    Interventions    (X) Inpatient interventions continue    7/23/2021 1:52 PM EDT by Park Forte telemetry, EEG, cardiology, IVF    * Milestone   Additional Notes   7/20/21   Care day 4   Intermediate Care   Subjective:  One of the pacer leads appears to not be functioning well.  He is frustrated.  Pain controlled.     VS 97.9, 13, 60, 96/57, 96% ra  5/10      General appearance: No apparent distress, appears stated age and cooperative. HEENT: Pupils equal, round, and reactive to light. Conjunctivae/corneas clear. Neck: Supple, with full range of motion. No jugular venous distention. Trachea midline. Respiratory:  Normal respiratory effort. Clear to auscultation, bilaterally without Rales/Wheezes/Rhonchi. Cardiovascular: Rate in the 50's and regular rhythm with normal S1/S2 without murmurs, rubs or gallops. Abdomen: Soft, non-tender, non-distended with normal bowel sounds. Musculoskeletal: No clubbing, cyanosis or edema bilaterally.  Full range of motion without deformity. Skin: Skin color, texture, turgor normal.  No rashes or lesions. Neurologic:  Neurovascularly intact without any focal sensory/motor deficits.  Cranial nerves: II-XII intact, grossly non-focal.   Psychiatric: Alert and oriented, thought content appropriate, normal insight   Capillary Refill: Brisk,3 seconds, normal    Peripheral Pulses: +2 palpable, equal bilaterally       Meds   Doxycycline 100mg po bid                     Assessment:   Sinus pause/sinus bradycardia: ongoing               -multiple pauses (>15 seconds) noted on telemetry review and event monitor               -TSH normal 7/2021               -s/p dual chamber pacemaker implant 7/19/2021 ([employer])    Syncope vs seizures: recurrent now stable                -events started 4/2021               -neurology following               -tilt table testing negative 7/19/2021   History of heroin abuse               -long term suboxone use (2018)   Cannabis use:               -UDS positive 7/2021   Tobacco abuse: smoking cessation is recommended   Hepatitis C   Hyperbilirubinemia        Plan:    NPO for RA lead revision this afternoon. (risks, benefits, and alternative therapy discussed)   Doxycycline 100 mg PO BID x 5 days post device implant    Post procedure instructions reviewed. Will need reinforcement after RA lead revision      Cardiology [address] - [facility] Day 4 (7/19/2021) by Loulou Aburto RN       Review Status Review Entered   Completed 7/23/2021 13:44      Criteria Review      Care Day: 4 Care Date: 7/19/2021 Level of Care: Intermediate Care    Guideline Day 2    Level Of Care    ( ) Floor    7/23/2021 1:44 PM EDT by Artur William      intermediate care    Clinical Status    ( ) * No ICU or intermediate care needs    Interventions    (X) Inpatient interventions continue    7/23/2021 1:44 PM EDT by Lincoln Ybarra telemetry, cardiology, EP    * Milestone   Additional Notes   7/19/21   Care day 4   Intermediate Care   Subjective:  Tele shows that he had multiple long spells of asystole yesterday evening.  They mostly occurred around 17:01.  One was 16 seconds, then a single beat, followed by another 7 seconds of asystole.  A minute later he had a 10 second pause, then two beats, followed by a 23 second period of asystole.  I alerted EP this AM.  The patient doesn't remember passing out yesterday evening, he must have just been lying in bed and went unconscious for awhile without realizing it. Makenzie Quiñonez feels fine currently. Lynne sustains mid 30's when sleeping, about 50 this morning while awake. VS97.5, 35, 80/31, 99%ra       General appearance: No apparent distress, appears stated age and cooperative. HEENT: Pupils equal, round, and reactive to light. Conjunctivae/corneas clear. Neck: Supple, with full range of motion. No jugular venous distention. Trachea midline. Respiratory:  Normal respiratory effort. Clear to auscultation, bilaterally without Rales/Wheezes/Rhonchi. Cardiovascular: bradycardic rate and regular rhythm with normal S1/S2 without murmurs, rubs or gallops. Abdomen: Soft, non-tender, non-distended with normal bowel sounds. Musculoskeletal: No clubbing, cyanosis or edema bilaterally.  Full range of motion without deformity. Skin: Skin color, texture, turgor normal.  No rashes or lesions. Neurologic:  Neurovascularly intact without any focal sensory/motor deficits.  Cranial nerves: II-XII intact, grossly non-focal.   Psychiatric: Alert and oriented, thought content appropriate, normal insight   Capillary Refill: Brisk,3 seconds, normal    Peripheral Pulses: +2 palpable, equal bilaterally       Labs   Results for Carlos Wild (MRN [id number]) as of 7/23/2021 13:47      7/19/2021 10:05   Creatinine: 0.8 (L)         Meds   Doxycycline 100mg po bid   Lovenox 40mg sq daily    IVF@ 100cc/hr   Vancymycin 1000mg IV   Assessment/Plan:       Active Hospital Problems     Diagnosis     · Sinus node dysfunction (HCC) [I49.5]     · Tobacco abuse [Z72.0]     · Cardiac asystole (HCC) [I46.9]     · Acute vomiting [R11.10]     · Breakthrough seizure ([facility] [G40.919]     · Chronic hepatitis C without hepatic coma (HCC) [B18.2]     · Heroin abuse ([facility] [F11.10]     · Syncope and collapse [R55]             The patient is a pleasant 28 Y M with a long history of substance abuse. [facility] has been incarcerated for much of his adult life, but recently cleaned up his act, moved back in with his family, and has been taking Vened[place] Booty still smokes marijuana but abstains from other street drugs.  He has been having self-limited spells of unconsciousness fairly frequently lately.  It usually happens in the morning when he stands up out of bed. Edil Rosado suddenly feels lightheaded and weak all over.  The next thing he knows he is \"waking up on the floor and my mom is freaking out. \" Edil Rosado says that he always urinates on himself during these spells. Edil Rosado says that they usually last about a minute, and within seconds of waking up he is completely alert and oriented without a postictal phase.  His mom tells him that his arms stick straight out in front of him, he is completely unconscious, and that he has gurgling breathing during these spells.  He doesn't seems to \"twist up like a pretzel and shake\" like his friend who has a known seizure disorder. Edil Rosado doesn't really go limp and lifeless, either, though.  The patient is stoic and doesn't seem to be embellishing his symptoms.  Cardiology recently gave him an outpatient cardiac monitor, and reportedly it has been showing multiple long asystolic periods corresponding to these spells.  One of these spells was 35 seconds long.  Cardiology called him and told him to come to the ED, prompting this admission. Edil Rosado feels pretty normal when he isn't having these spells, and he is eager to go back home.           Suspected syncope due to spells of asystole.     - he had multiple long systolic pauses here on tele as well.  On 7/18 at 17:02 he had a 10 second pause, then two beats, followed by a 23 second period of asystole. - electrolytes fine.  Normal TSH.  F/u TTE.   - EP determining whether he needs a pacemaker. - suboxone seems to be a stabilizing factor in his life.  Would like to continue this if at all possible.  Perhaps he can quit the marijuana.    - no convincing evidence of ongoing myocarditis.  Trop negative.  Normal CRP.  He did get both doses of the Moderna COVID vaccine, the second dose on 4/24/2021, and his first spell of syncope was about two weeks later. Edil Rosado does fit the young male demographic.  Typically myocarditis has occurred within a week of the second dose.  The patient didn't have any chest pain at that time, but he did have the common side effect of subjective fevers and chills.  His symptoms seemed to last a little longer than most, about 3 days. - patient works in construction, regularly uses a chainsaw. Stephen Shields may need significant time off work, and/or driving restrictions, defer to EP.       Possible seizures.  A primary seizure disorder seems very unlikely. Stephen Shields is on keppra, but neurology said that if his symptoms seem to resolve after an EP intervention then keppra may not be necessary in the future.     - No MRI of the brain necessary, no need to delay pacer placement.       HCV.  Consider outpatient testing and treatment.       Tobacco smoking, nicotine dependence.  Encouraged to quit.  Educated about risks.         Anxiety.  Started paroxetine.  PCP could increase every 1-2 weeks as indicated.           DVT Prophylaxis: enoxaparin   Diet: Diet NPO Exceptions are: Sips of Water with Meds   Code Status: Full Code       PT/OT Eval Status: not indicated       Dispo - when EP is OK with discharge. Malachi Reese at this point.  IPTA, no needs.          EP Note   Assessment:   Sinus pause/sinus bradycardia: ongoing               -multiple pauses (>15 seconds) noted on telemetry review and event monitor               -TSH normal 7/2021   Syncope vs seizures: recurrent                -events started 4/2021               -neurology following               -midodrine started 7/18/2021 for possible vasodepressor syncope    History of heroin abuse               -long term suboxone use (2018)   Cannabis use:               -UDS positive 7/2021   Tobacco abuse: smoking cessation is recommended   Hepatitis C   Hyperbilirubinemia        Plan:    Plan for tilt table test this afternoon   Midodrine on hold awaiting testing    NPO for possible pacemaker implant    Okay to transfer to ICU due to ongoing sinus pauses    Atropine at bedside    Will ask for updated echo today    EEG pending

## 2021-07-26 ENCOUNTER — TELEPHONE (OUTPATIENT)
Dept: CARDIOLOGY CLINIC | Age: 31
End: 2021-07-26

## 2021-07-26 DIAGNOSIS — R42 DIZZINESS: ICD-10-CM

## 2021-07-26 DIAGNOSIS — R55 SYNCOPE, UNSPECIFIED SYNCOPE TYPE: ICD-10-CM

## 2021-07-27 ENCOUNTER — NURSE ONLY (OUTPATIENT)
Dept: CARDIOLOGY CLINIC | Age: 31
End: 2021-07-27
Payer: COMMERCIAL

## 2021-07-27 DIAGNOSIS — Z95.0 PACEMAKER: ICD-10-CM

## 2021-07-27 DIAGNOSIS — I49.5 SINUS NODE DYSFUNCTION (HCC): ICD-10-CM

## 2021-07-27 DIAGNOSIS — I46.9 CARDIAC ASYSTOLE (HCC): ICD-10-CM

## 2021-07-27 DIAGNOSIS — R55 SYNCOPE AND COLLAPSE: ICD-10-CM

## 2021-07-27 PROCEDURE — 93280 PM DEVICE PROGR EVAL DUAL: CPT | Performed by: INTERNAL MEDICINE

## 2021-07-27 NOTE — TELEPHONE ENCOUNTER
Pt was seen today with device clinic and had monitor and told pt our office will return the monitor for him. Pt had a pacemaker placed due to cardiac asystole. Monitor has been given to medical staff.

## 2021-07-27 NOTE — PROGRESS NOTES
Patient presents to the device clinic today for a programming evaluation for his pacemaker. Patient has a history of S/C, SND, and asystole. Patient's device was implanted on 7/19 - RA lead revision on 7/20, both by Dr. Eleanor Martin. Since then, 3 SVT-ST events recorded. Longest x 1 minutes on 7/21. Rates range from 162-171 bpm.     AP 66%  0.4%    All sensing and pacing parameters are within normal range. No changes need to be made at this time. Incision is closed, clean, and dry with all dressings removed and site left open to the air. Patient education was provided about site care, device functionality, in home monitoring, and any other patient questions and/or concerns were addressed. Aftercare and remote monitoring literature was provided. Patient voices understanding. Please see interrogation for more detail. Patient will follow up in 3 months in office or remotely. See Paceart report under the Cardiology tab.

## 2021-07-28 ENCOUNTER — TELEPHONE (OUTPATIENT)
Dept: CARDIOLOGY CLINIC | Age: 31
End: 2021-07-28

## 2021-07-28 DIAGNOSIS — Z95.0 PACEMAKER: ICD-10-CM

## 2021-07-28 NOTE — TELEPHONE ENCOUNTER
Pt is having the same type of symptoms he was having prior to having his pacer placed. For the last couple of days he has been experiencing hot flashes then breaks out into a sweat, high HR, SOB, and is dizzy during all of this. He stated he is no longer passing out like he was prior to the pacer but all other symptoms have returned. Pt is currently taking Keppra 500 mg and Suboxone 8-2 mg film. Pt is very frustrated that these symptoms have returned and wants to know where to go from here.

## 2021-07-28 NOTE — TELEPHONE ENCOUNTER
Spoke with patient. He has not sent transmission yet.  Informed him that the office closes at 5 pm. V/U

## 2021-07-29 NOTE — TELEPHONE ENCOUNTER
Device function normal.  Wound C/D/I. I'm sorry he feels abnormal but all is well from device standpoint.

## 2021-07-29 NOTE — TELEPHONE ENCOUNTER
Manual transmission of pacemaker and/or ICD, or implanted heart monitor shows normal cardiac device function. Patient's last device interrogation was on 7/27. Estimated device longevity is 12.7 years. Patient has a history of S/C, SND, and asystole. AP 61.2%   0.3%    Since last device interrogation, no arrhythmias recorded. CLARICE RUDOLPH to review. Please see the Paceart report under the Cardiology tab for more detail.

## 2021-08-20 ENCOUNTER — TELEPHONE (OUTPATIENT)
Dept: CARDIOLOGY CLINIC | Age: 31
End: 2021-08-20

## 2021-08-20 DIAGNOSIS — R42 DIZZINESS: Primary | ICD-10-CM

## 2021-08-20 DIAGNOSIS — R00.2 PALPITATIONS: ICD-10-CM

## 2021-08-20 NOTE — TELEPHONE ENCOUNTER
Pt had a pacer placed on 7/19/2021. Pt is having episodes where he breaks out in a sweat is SOB and has a racing HR. It happened in front of his drug counselor and she stated it looked like a panic attack. Pt just wants to make sure there is nothing going on with his pacer since it was placed so recently. Pt also is taking Suboxone and wants to make sure this is not affecting him cardiac wise.

## 2021-08-20 NOTE — TELEPHONE ENCOUNTER
I spoke with patient and ask that he send us a transmission to see what his heart rate was doing at the time he felt like it was racing.

## 2021-08-23 NOTE — TELEPHONE ENCOUNTER
All short duration. Please check on patient to see if symptoms. If symptoms then send a 2 week monitor and have him see me to discuss EPS.

## 2021-08-23 NOTE — TELEPHONE ENCOUNTER
Remote transmission received for patient's dual chamber PACEMAKER. Transmission shows normal sensing and pacing function. EP physician will review. See interrogation under the cardiology tab in the [address] [place] [address] [address] field for more details. Will continue to monitor remotely. Date of Interrogation: 21-Aug-2021 13:37:54    Episodes Since: 28-Jul-2021  83 SVT-ST recorded, longest 2 min w/ rates 158-167 bpm. Average v rates 80 bpm during daytime and 60 bpm at night. Svt recorded 8/12/21 from [id number].  8/12/21 2030, 8/13 1500, 8/14 1230, 8/19 1030 and 2044, 8/21 1300.

## 2021-08-24 ENCOUNTER — VIRTUAL VISIT (OUTPATIENT)
Dept: FAMILY MEDICINE CLINIC | Age: 31
End: 2021-08-24
Payer: COMMERCIAL

## 2021-08-24 DIAGNOSIS — J06.9 VIRAL URI: Primary | ICD-10-CM

## 2021-08-24 PROCEDURE — G8427 DOCREV CUR MEDS BY ELIG CLIN: HCPCS | Performed by: NURSE PRACTITIONER

## 2021-08-24 PROCEDURE — 99212 OFFICE O/P EST SF 10 MIN: CPT | Performed by: NURSE PRACTITIONER

## 2021-08-24 PROCEDURE — 4004F PT TOBACCO SCREEN RCVD TLK: CPT | Performed by: NURSE PRACTITIONER

## 2021-08-24 PROCEDURE — G8420 CALC BMI NORM PARAMETERS: HCPCS | Performed by: NURSE PRACTITIONER

## 2021-08-24 RX ORDER — BENZONATATE 100 MG/1
100 CAPSULE ORAL 3 TIMES DAILY PRN
Qty: 21 CAPSULE | Refills: 0 | Status: SHIPPED | OUTPATIENT
Start: 2021-08-24 | End: 2021-08-31

## 2021-08-24 ASSESSMENT — ENCOUNTER SYMPTOMS
DIARRHEA: 0
COUGH: 1
SORE THROAT: 0
RHINORRHEA: 1
SINUS PRESSURE: 0
SINUS PAIN: 0
SHORTNESS OF BREATH: 0
VOMITING: 1
NAUSEA: 0

## 2021-08-24 NOTE — PROGRESS NOTES
Maria Elena Sainz (:  1990) is a 28 y.o. male,Established patient, here for evaluation of the following chief complaint(s): Cough and Chest Congestion         ASSESSMENT/PLAN:  1. Viral URI  symptoms improving, recommend symptomatic treatment. May continue Mucinex  Given RX for tessalon perles as needed for cough  Recommend COVID testing- he can go to urgent care for rapid test  Self quarantine as indicated      Return if symptoms worsen or fail to improve. SUBJECTIVE/OBJECTIVE:  HPI   Seen virtually today with complaints of 3-day history of nasal congestion, cough, chest congestion. Had episode of vomiting this morning. Reports he continues to have \"episodes\" that occur when his heart races. Feels that his cough and nasal congestion are improving. He has taken a few doses of Mucinex which really helped. No fever, chest pain or shortness of breath. He did go back to work about a week ago, he does work with the [employer]. He also smokes tobacco, marijuana and vapes. Review of Systems   Constitutional: Positive for chills and diaphoresis. Negative for fatigue and fever. HENT: Positive for rhinorrhea. Negative for postnasal drip, sinus pressure, sinus pain, sneezing and sore throat. Respiratory: Positive for cough. Negative for shortness of breath. Cardiovascular: Negative for chest pain and leg swelling. Gastrointestinal: Positive for vomiting (this morning). Negative for diarrhea and nausea. Neurological: Negative for dizziness and headaches (denies).        Patient-Reported Vitals 2021   Patient-Reported Weight 170lb   Patient-Reported Height 6'        Physical Exam    [INSTRUCTIONS:  \"[x]\" Indicates a positive item  \"[]\" Indicates a negative item  -- DELETE ALL ITEMS NOT EXAMINED]    Constitutional: [x] Appears well-developed and well-nourished [x] No apparent distress      [] Abnormal -     Mental status: [x] Alert and awake  [x] Oriented to person/place/time [x] Able to follow commands    [] Abnormal -     Eyes:   EOM    [x]  Normal    [] Abnormal -   Sclera  [x]  Normal    [] Abnormal -          Discharge [x]  None visible   [] Abnormal -     HENT: [] Normocephalic, atraumatic  [] Abnormal -   [] Mouth/Throat: Mucous membranes are moist    External Ears [x] Normal  [] Abnormal -    Neck: [x] No visualized mass [] Abnormal -     Pulmonary/Chest: [x] Respiratory effort normal   [x] No visualized signs of difficulty breathing or respiratory distress        [] Abnormal -      Musculoskeletal:   [x] Normal gait with no signs of ataxia         [x] Normal range of motion of neck        [] Abnormal -     Neurological:        [x] No Facial Asymmetry (Cranial nerve 7 motor function) (limited exam due to video visit)          [x] No gaze palsy        [] Abnormal -          Skin:        [x] No significant exanthematous lesions or discoloration noted on facial skin         [] Abnormal -            Psychiatric:       [x] Normal Affect [] Abnormal -        [x] No Hallucinations    Other pertinent observable physical exam findings:-    On this date 8/24/2021 I have spent 15 minutes reviewing previous notes, test results and face to face (virtual) with the patient discussing the diagnosis and importance of compliance with the treatment plan as well as documenting on the day of the visit. Juan Rosenberg, was evaluated through a synchronous (real-time) audio-video encounter. The patient (or guardian if applicable) is aware that this is a billable service. Verbal consent to proceed has been obtained within the past 12 months. The visit was conducted pursuant to the emergency declaration under the [facility], [id number] waiver authority and the [employer] and [employer] Act. Patient identification was verified, and a caregiver was present when appropriate.  The patient was located in a state where the provider was credentialed to provide care. An electronic signature was used to authenticate this note.     --Nazario Obrien, DESMOND - CNP

## 2021-08-24 NOTE — PATIENT INSTRUCTIONS
· Symptoms appear viral and antibiotics are not indicated at this time.   · Continue symptomatic treatment  · OK to continue Mucinex  · Sent in prescription for tesslon perles for your cough  · You should be tested for COVID-19- call local urgent care for rapid test  · Self quarantine as indicated  · Follow up as needed if symptoms worsen or fail to improve

## 2021-08-25 ENCOUNTER — TELEPHONE (OUTPATIENT)
Dept: CARDIOLOGY CLINIC | Age: 31
End: 2021-08-25

## 2021-08-25 NOTE — TELEPHONE ENCOUNTER
Monitor placed by [employer]   Monitor company [employer]  Length of monitor 14 days   Monitor ordered by [id number]  Serial number mailed   Kit ID   Activation successful prior to pt leaving office?  NA

## 2021-08-30 ENCOUNTER — OFFICE VISIT (OUTPATIENT)
Dept: CARDIOLOGY CLINIC | Age: 31
End: 2021-08-30
Payer: COMMERCIAL

## 2021-08-30 ENCOUNTER — TELEPHONE (OUTPATIENT)
Dept: FAMILY MEDICINE CLINIC | Age: 31
End: 2021-08-30

## 2021-08-30 VITALS
HEIGHT: 72 IN | BODY MASS INDEX: 21.94 KG/M2 | SYSTOLIC BLOOD PRESSURE: 100 MMHG | WEIGHT: 162 LBS | HEART RATE: 75 BPM | DIASTOLIC BLOOD PRESSURE: 70 MMHG | OXYGEN SATURATION: 98 %

## 2021-08-30 DIAGNOSIS — I49.5 SINUS NODE DYSFUNCTION (HCC): Primary | ICD-10-CM

## 2021-08-30 DIAGNOSIS — R55 SYNCOPE, UNSPECIFIED SYNCOPE TYPE: ICD-10-CM

## 2021-08-30 DIAGNOSIS — R06.02 SHORTNESS OF BREATH: ICD-10-CM

## 2021-08-30 PROCEDURE — 93270 REMOTE 30 DAY ECG REV/REPORT: CPT | Performed by: INTERNAL MEDICINE

## 2021-08-30 PROCEDURE — 99214 OFFICE O/P EST MOD 30 MIN: CPT | Performed by: NURSE PRACTITIONER

## 2021-08-30 NOTE — PROGRESS NOTES
[facility]   Electrophysiology Outpatient Note              Date:  August 30, 2021  Patient name: Marco Yañez  YOB: 1990    Primary Care physician: DESMOND Parra CNP    HISTORY OF PRESENT ILLNESS: The patient is a 28 y.o.  male with a history of sinus node dysfunction, ST/SVT, syncope, hepatitis C, heroin abuse, cannabis use, and tobacco/nicotine abuse. In 6/2021, he was evaluated for syncope and ambulatory cardiac monitoring was ordered. Cardiology office was urgently notified of pauses and heart rates in the 30's. He was directed to the ED and admitted. Telemetry review showed recurrent pauses. On 7/19/2021, he underwent dual chamber pacemaker. On 7/20/2021, he underwent RA lead revision. On 8/20/2021, device interrogation showed brief episodes of ST/SVT. A two week event monitor was ordered and shipped to patient's residence. Today he is being seen for sick sinus syndrome and syncope. Patient complains of cough and shortness of breath. He was recently evaluated at urgent care for this. He is unsure of his Covid test results. Was prescribed amoxicillin and Tessalon pearls. He has not worn the event monitor yet. Denies chest pain, palpitations or dizziness. Has not had any syncopal events since pacemaker implant. Past Medical History:   has a past medical history of Drug abuse ([address] Past Surgical History:   has a past surgical history that includes Mandible surgery and pacemaker placement. Home Medications:    Prior to Admission medications    Medication Sig Start Date End Date Taking? Authorizing Provider   benzonatate (TESSALON) 100 MG capsule Take 1 capsule by mouth 3 times daily as needed for Cough 8/24/21 8/31/21 Yes DESMOND Shell CNP   medical marijuana Take by mouth as needed.    Yes Historical Provider, MD   levETIRAcetam (KEPPRA) 500 MG tablet Take 1 tablet by mouth 2 times daily 7/3/21  Yes Janak Murray MD buprenorphine-naloxone (SUBOXONE) 8-2 MG FILM SL film Place 1 Film under the tongue daily. Yes Historical Provider, MD       Allergies:  Patient has no known allergies. Social History:   reports that he has been smoking cigarettes. He has been smoking about 0.25 packs per day. He has never used smokeless tobacco. He reports current drug use. Drug: Marijuana. He reports that he does not drink alcohol. Family History: family history is not on file. All 14 point review of systems are completed and pertinent positives are mentioned in the history of present illness. Other systems are reviewed and are negative. PHYSICAL EXAM:    Vital signs:    /70   Pulse 75   Ht 6' (1.829 m)   Wt 162 lb (73.5 kg)   SpO2 98%   BMI 21.97 kg/m²      Constitutional and general appearance: alert, cooperative, no distress and appears stated age  HEENT: PERRL, no cervical lymphadenopathy. No masses palpable. Normal oral mucosa  Respiratory:  · Normal excursion and expansion without use of accessory muscles  · Resp auscultation: + wheezes/rhonchi throughout   Cardiovascular:  · The apical impulse is not displaced  · Heart tones are crisp and normal. regular S1 and S2.  · Jugular venous pulsation Normal  · The carotid upstroke is normal in amplitude and contour without delay or bruit  · Peripheral pulses are symmetrical and full   Abdomen:  · No masses or tenderness  · Bowel sounds present  Extremities:  ·  No cyanosis or clubbing  ·  No lower extremity edema  ·  Skin: warm and dry  Neurological:  · Alert and oriented  · Moves all extremities well  · No abnormalities of mood, affect, memory, mentation, or behavior are noted    DATA:    Echo 1/10/2018:   Summary   Normal LV systolic function with an estimated EF of 55%.   Normal left ventricular diastolic filling pressure.   The mitral valve leaflets appear mildly myxomatous.   There is mild thickening of TV and possible echodensity seen on certain   views.  Cannot r/o vegetation and recommend AI if clinically indicated.   Trivial tricuspid regurgitation and mild pulmonic regurgitation   Systolic pulmonary artery pressure (SPAP) is normal and estimated at 29mmHg   (RA pressure 15mmHg).        All labs and testing reviewed. CARDIOLOGY LABS:   CBC: No results for input(s): WBC, HGB, HCT, PLT in the last 72 hours. BMP: No results for input(s): NA, K, CO2, BUN, CREATININE, LABGLOM, GLUCOSE in the last 72 hours. PT/INR: No results for input(s): PROTIME, INR in the last 72 hours. APTT:No results for input(s): APTT in the last 72 hours. FASTING LIPID PANEL:No results found for: HDL, LDLDIRECT, LDLCALC, TRIG  LIVER PROFILE:No results for input(s): AST, ALT, ALB in the last 72 hours. IMPRESSION:    Assessment:  Sick sinus syndrome: resolved, stable               -TSH normal 7/2021              -s/p dual chamber pacemaker implant 7/19/2021 ([employer])   SVT/ST: stable, unclear if symptomatic    -noted on device interrogation 8/2021  Syncope vs seizures: recurrent now stable               -events started 4/2021              -neurology following              -tilt table testing negative 7/19/2021  History of heroin abuse              -long term suboxone use (2018)  Cannabis use:              -UDS positive 7/2021  Tobacco abuse: smoking cessation is recommended  Hepatitis C  Hyperbilirubinemia   Cough/shortness of breath: noted at today's visit       Plan:   Continue remote device transmissions every three months  Two week event monitor for ST/SVT on device interrogation. Patient to place after today's visit. CXR today due to cough and wheezing. If abnormal, I will forward results to PCP. Abstaining from vaping is recommended (nicotine and THC)  Follow up on 10/18/2021    Patient was seen outside of global device window for syncope.      TRISTIAN Campbell, APRN-CNP  [address]  [phone]

## 2021-08-30 NOTE — LETTER
[address] Cardiology - [address] [facility] [address] [address]  Phone: [phone]  Fax: [phone]    DESMOND Mccloud CNP    October 5, 2021     DESMOND Alanis CNP  [address] [address]    Patient: Sherly Zambrano   MR Number: [id number]   YOB: 1990   Date of Visit: 8/30/2021       Dear Cyndi Pickering: Thank you for referring Vilma Cesar to me for evaluation/treatment. Below are the relevant portions of my assessment and plan of care. If you have questions, please do not hesitate to call me. I look forward to following Colten Shoulder along with you.     Sincerely,      DESMOND Olivas CNP

## 2021-08-30 NOTE — PATIENT INSTRUCTIONS
Remote device tranmissions every three months    Place event monitor today. We will notify you of results. Refrain from vaping.      Chest XRAY due to cough/congestion    Follow up 10/18/2021

## 2021-08-30 NOTE — TELEPHONE ENCOUNTER
Patient called. He had a virtual appt 8/24. Either the same day or next day hew went to Urgent care for a COVID test.  He is negative. They treated him with amoxicillin. He finished today.   He still has a lot of mucus, coughing, chest and head congestion  He is asking for a z-pack  [facility]

## 2021-08-31 ENCOUNTER — NURSE ONLY (OUTPATIENT)
Dept: CARDIOLOGY CLINIC | Age: 31
End: 2021-08-31
Payer: COMMERCIAL

## 2021-08-31 DIAGNOSIS — Z95.0 PACEMAKER: ICD-10-CM

## 2021-08-31 DIAGNOSIS — I49.5 SINUS NODE DYSFUNCTION (HCC): ICD-10-CM

## 2021-08-31 NOTE — TELEPHONE ENCOUNTER
Cardiology ordered chest xray on him yesterday- I would like for him to get that done. Has he been given any inhaler? Albuterol? They said he was wheezing on exam and that would help. I would like for CXR prior to further antibiotics.

## 2021-09-02 NOTE — PROGRESS NOTES
Remote transmission received for patient's dual chamber PACEMAKER. Transmission shows normal sensing and pacing function. EP physician will review. See interrogation under the cardiology tab in the [address] [place] [address] [address] field for more details. Will continue to monitor remotely. Hx SVT. Last interrogation 8/21/21. Episodes Since: 21-Aug-2021  70 SVT/ST w/ rates 158-167 bpm and longest 2 min. Average V rates trending up over the last 2 weeks.   Pacing (% of Time Since 21-Aug-2021)   < 0.1% (MVP On)  AP 28.1%

## 2021-09-03 PROCEDURE — 93296 REM INTERROG EVL PM/IDS: CPT | Performed by: INTERNAL MEDICINE

## 2021-09-03 PROCEDURE — 93294 REM INTERROG EVL PM/LDLS PM: CPT | Performed by: INTERNAL MEDICINE

## 2021-09-13 PROCEDURE — 93272 ECG/REVIEW INTERPRET ONLY: CPT | Performed by: INTERNAL MEDICINE

## 2021-09-15 ENCOUNTER — TELEPHONE (OUTPATIENT)
Dept: CARDIOLOGY CLINIC | Age: 31
End: 2021-09-15

## 2021-09-17 ENCOUNTER — TELEPHONE (OUTPATIENT)
Dept: CARDIOLOGY CLINIC | Age: 31
End: 2021-09-17

## 2021-09-17 DIAGNOSIS — R42 DIZZINESS: ICD-10-CM

## 2021-09-17 DIAGNOSIS — R00.2 PALPITATIONS: ICD-10-CM

## 2021-09-21 NOTE — TELEPHONE ENCOUNTER
Patient request refill for Keppra 500 mg. He has decreased his medication to taking once daily. Mague Spivey was prescribed medication when he was in the hospital for seizures. His pharmacy is [facility]. Past appointment was 8/24/21.

## 2021-09-23 RX ORDER — LEVETIRACETAM 500 MG/1
500 TABLET ORAL 2 TIMES DAILY
Qty: 60 TABLET | Refills: 1 | Status: SHIPPED | OUTPATIENT
Start: 2021-09-23

## 2021-11-30 ENCOUNTER — NURSE ONLY (OUTPATIENT)
Dept: CARDIOLOGY CLINIC | Age: 31
End: 2021-11-30

## 2021-11-30 DIAGNOSIS — Z95.0 PACEMAKER: ICD-10-CM

## 2021-11-30 DIAGNOSIS — I46.9 CARDIAC ASYSTOLE (HCC): ICD-10-CM

## 2021-11-30 DIAGNOSIS — I49.5 SINUS NODE DYSFUNCTION (HCC): ICD-10-CM

## 2021-12-01 PROCEDURE — 93294 REM INTERROG EVL PM/LDLS PM: CPT | Performed by: INTERNAL MEDICINE

## 2021-12-01 PROCEDURE — 93296 REM INTERROG EVL PM/IDS: CPT | Performed by: INTERNAL MEDICINE

## 2021-12-01 NOTE — PROGRESS NOTES
Remote transmission received for patient's dual chamber PACEMAKER.  Transmission shows normal sensing and pacing function.  EP physician will review.  See interrogation under the cardiology tab in the [facility] [address] field for more details.  Will continue to monitor remotely. Hx SVT. Date of Interrogation: 30-Nov-2021 22:35:26-ST noted. Episodes Since: 30-Sep-2021  1 Non-sustained VT. 25 SVT-5 Fast A & V-SVT/ST noted. Cardiac compass shows stable/consistent trends.

## 2021-12-13 ENCOUNTER — TELEPHONE (OUTPATIENT)
Dept: FAMILY MEDICINE CLINIC | Age: 31
End: 2021-12-13

## 2023-01-17 ENCOUNTER — TELEPHONE (OUTPATIENT)
Dept: CARDIOLOGY CLINIC | Age: 33
End: 2023-01-17

## 2023-01-17 NOTE — TELEPHONE ENCOUNTER
Pt has been in [place] for 1 yr. Mother has transmission equipment with her. Pt was to contact us per mother. So he will not be sending transmissions till her gets back this summer.

## 2023-03-14 NOTE — TELEPHONE ENCOUNTER
Patient informed of Saint Levy CNP response.  He scheduled for   Future Appointments   Date Time Provider Tushar Sellers   10/7/2021  1:40 PM Miguel Odell, APRN - CNP [facility] [place] [facility]   11/15/2021  1:00 PM SCHEDULE, [facility] [employer]   11/15/2021  1:00 PM DESMOND Campbell - JENNIFER [employer]   11/30/2021  7:10 AM SCHEDULE, [facility]ida [employer]   3/1/2022  7:10 AM SCHEDULE, [facility] TRANSMISSION [employer]   5/31/2022  7:10 AM SCHEDULE, [facility] TRANSMISSION [employer]   8/30/2022  7:10 AM SCHEDULE, [facility] [employer] [employer]   11/29/2022  7:10 AM SCHEDULE, [facility] TRANSMISSION [employer]   2/28/2023  7:10 AM SCHEDULE, [facility] TRANSMISSION [employer]   5/30/2023  7:10 AM SCHEDULE, [facility] [employer] [employer]   8/29/2023  7:10 AM SCHEDULE, [facility] [employer] [employer]   11/28/2023  7:10 AM SCHEDULE, [facility] TRANSMISSION [employer] no

## 2023-06-23 ENCOUNTER — TELEPHONE (OUTPATIENT)
Dept: CARDIOLOGY CLINIC | Age: 33
End: 2023-06-23

## 2023-06-23 NOTE — TELEPHONE ENCOUNTER
Pts dad called to see what pt needs to do to since he is back in [facility]? They are wondering if pt will need to send a transmission since he is back home in [place]? Was asked if they wanted to schedule an appt with the ep team. They would like to figure out the device first. Please advise.

## 2023-06-26 ENCOUNTER — OFFICE VISIT (OUTPATIENT)
Dept: FAMILY MEDICINE CLINIC | Age: 33
End: 2023-06-26
Payer: COMMERCIAL

## 2023-06-26 VITALS
TEMPERATURE: 97.1 F | RESPIRATION RATE: 16 BRPM | BODY MASS INDEX: 39.74 KG/M2 | DIASTOLIC BLOOD PRESSURE: 72 MMHG | SYSTOLIC BLOOD PRESSURE: 106 MMHG | OXYGEN SATURATION: 94 % | HEART RATE: 82 BPM | WEIGHT: 293 LBS

## 2023-06-26 DIAGNOSIS — E03.9 HYPOTHYROIDISM, UNSPECIFIED TYPE: ICD-10-CM

## 2023-06-26 DIAGNOSIS — Z95.0 PACEMAKER: ICD-10-CM

## 2023-06-26 DIAGNOSIS — F20.9 SCHIZOPHRENIA, UNSPECIFIED TYPE (HCC): ICD-10-CM

## 2023-06-26 DIAGNOSIS — B18.2 CHRONIC HEPATITIS C WITHOUT HEPATIC COMA (HCC): ICD-10-CM

## 2023-06-26 DIAGNOSIS — F41.9 ANXIETY: ICD-10-CM

## 2023-06-26 DIAGNOSIS — B18.2 CHRONIC HEPATITIS C WITHOUT HEPATIC COMA (HCC): Primary | ICD-10-CM

## 2023-06-26 PROCEDURE — G8427 DOCREV CUR MEDS BY ELIG CLIN: HCPCS | Performed by: STUDENT IN AN ORGANIZED HEALTH CARE EDUCATION/TRAINING PROGRAM

## 2023-06-26 PROCEDURE — G8417 CALC BMI ABV UP PARAM F/U: HCPCS | Performed by: STUDENT IN AN ORGANIZED HEALTH CARE EDUCATION/TRAINING PROGRAM

## 2023-06-26 PROCEDURE — 99214 OFFICE O/P EST MOD 30 MIN: CPT | Performed by: STUDENT IN AN ORGANIZED HEALTH CARE EDUCATION/TRAINING PROGRAM

## 2023-06-26 PROCEDURE — 4004F PT TOBACCO SCREEN RCVD TLK: CPT | Performed by: STUDENT IN AN ORGANIZED HEALTH CARE EDUCATION/TRAINING PROGRAM

## 2023-06-26 RX ORDER — OLANZAPINE 15 MG/1
15 TABLET ORAL NIGHTLY
COMMUNITY
End: 2023-06-26 | Stop reason: SDUPTHER

## 2023-06-26 RX ORDER — LEVOTHYROXINE SODIUM 0.05 MG/1
50 TABLET ORAL DAILY
Qty: 30 TABLET | Status: CANCELLED | OUTPATIENT
Start: 2023-06-26

## 2023-06-26 RX ORDER — BUSPIRONE HYDROCHLORIDE 10 MG/1
20 TABLET ORAL 2 TIMES DAILY
Qty: 60 TABLET | Refills: 4 | Status: SHIPPED | OUTPATIENT
Start: 2023-06-26

## 2023-06-26 RX ORDER — OLANZAPINE 15 MG/1
15 TABLET ORAL 2 TIMES DAILY
Qty: 60 TABLET | Refills: 0 | Status: SHIPPED | OUTPATIENT
Start: 2023-06-26

## 2023-06-26 RX ORDER — BUSPIRONE HYDROCHLORIDE 10 MG/1
10 TABLET ORAL 3 TIMES DAILY
COMMUNITY
End: 2023-06-26 | Stop reason: SDUPTHER

## 2023-06-26 RX ORDER — LEVOTHYROXINE SODIUM 0.05 MG/1
50 TABLET ORAL DAILY
COMMUNITY
Start: 2023-06-08

## 2023-06-26 SDOH — ECONOMIC STABILITY: INCOME INSECURITY: HOW HARD IS IT FOR YOU TO PAY FOR THE VERY BASICS LIKE FOOD, HOUSING, MEDICAL CARE, AND HEATING?: NOT HARD AT ALL

## 2023-06-26 SDOH — ECONOMIC STABILITY: HOUSING INSECURITY
IN THE LAST 12 MONTHS, WAS THERE A TIME WHEN YOU DID NOT HAVE A STEADY PLACE TO SLEEP OR SLEPT IN A SHELTER (INCLUDING NOW)?: NO

## 2023-06-26 SDOH — ECONOMIC STABILITY: FOOD INSECURITY: WITHIN THE PAST 12 MONTHS, YOU WORRIED THAT YOUR FOOD WOULD RUN OUT BEFORE YOU GOT MONEY TO BUY MORE.: NEVER TRUE

## 2023-06-26 SDOH — ECONOMIC STABILITY: FOOD INSECURITY: WITHIN THE PAST 12 MONTHS, THE FOOD YOU BOUGHT JUST DIDN'T LAST AND YOU DIDN'T HAVE MONEY TO GET MORE.: NEVER TRUE

## 2023-06-26 ASSESSMENT — ANXIETY QUESTIONNAIRES
1. FEELING NERVOUS, ANXIOUS, OR ON EDGE: 0
6. BECOMING EASILY ANNOYED OR IRRITABLE: 0
4. TROUBLE RELAXING: 0
5. BEING SO RESTLESS THAT IT IS HARD TO SIT STILL: 0
3. WORRYING TOO MUCH ABOUT DIFFERENT THINGS: 0
2. NOT BEING ABLE TO STOP OR CONTROL WORRYING: 0
7. FEELING AFRAID AS IF SOMETHING AWFUL MIGHT HAPPEN: 0
IF YOU CHECKED OFF ANY PROBLEMS ON THIS QUESTIONNAIRE, HOW DIFFICULT HAVE THESE PROBLEMS MADE IT FOR YOU TO DO YOUR WORK, TAKE CARE OF THINGS AT HOME, OR GET ALONG WITH OTHER PEOPLE: NOT DIFFICULT AT ALL
GAD7 TOTAL SCORE: 0

## 2023-06-26 ASSESSMENT — PATIENT HEALTH QUESTIONNAIRE - PHQ9
SUM OF ALL RESPONSES TO PHQ QUESTIONS 1-9: 0
SUM OF ALL RESPONSES TO PHQ9 QUESTIONS 1 & 2: 0
SUM OF ALL RESPONSES TO PHQ QUESTIONS 1-9: 0
DEPRESSION UNABLE TO ASSESS: FUNCTIONAL CAPACITY MOTIVATION LIMITS ACCURACY
SUM OF ALL RESPONSES TO PHQ QUESTIONS 1-9: 0
SUM OF ALL RESPONSES TO PHQ QUESTIONS 1-9: 0
1. LITTLE INTEREST OR PLEASURE IN DOING THINGS: 0
2. FEELING DOWN, DEPRESSED OR HOPELESS: 0

## 2023-06-26 ASSESSMENT — ENCOUNTER SYMPTOMS
CONSTIPATION: 0
DIARRHEA: 0
NAUSEA: 0

## 2023-06-26 NOTE — TELEPHONE ENCOUNTER
Noted - deb PRIEST Patient Instruction  Please call and schedule MRI at number [phone].    Care Manager to have orders for PT sent to [facility].  Call there in a few days to set up therapy [phone]

## 2023-06-27 LAB
ALBUMIN SERPL-MCNC: 4.5 G/DL (ref 3.4–5)
ALBUMIN/GLOB SERPL: 1.4 {RATIO} (ref 1.1–2.2)
ALP SERPL-CCNC: 103 U/L (ref 40–129)
ALT SERPL-CCNC: 104 U/L (ref 10–40)
ANION GAP SERPL CALCULATED.3IONS-SCNC: 16 MMOL/L (ref 3–16)
AST SERPL-CCNC: 69 U/L (ref 15–37)
BASOPHILS # BLD: 0.1 K/UL (ref 0–0.2)
BASOPHILS NFR BLD: 1.4 %
BILIRUB SERPL-MCNC: 0.7 MG/DL (ref 0–1)
BUN SERPL-MCNC: 13 MG/DL (ref 7–20)
CALCIUM SERPL-MCNC: 9.5 MG/DL (ref 8.3–10.6)
CHLORIDE SERPL-SCNC: 100 MMOL/L (ref 99–110)
CHOLEST SERPL-MCNC: 257 MG/DL (ref 0–199)
CO2 SERPL-SCNC: 20 MMOL/L (ref 21–32)
CREAT SERPL-MCNC: 1.1 MG/DL (ref 0.9–1.3)
DEPRECATED RDW RBC AUTO: 15.6 % (ref 12.4–15.4)
EOSINOPHIL # BLD: 0.2 K/UL (ref 0–0.6)
EOSINOPHIL NFR BLD: 3 %
EST. AVERAGE GLUCOSE BLD GHB EST-MCNC: 108.3 MG/DL
GFR SERPLBLD CREATININE-BSD FMLA CKD-EPI: >60 ML/MIN/{1.73_M2}
GLUCOSE SERPL-MCNC: 88 MG/DL (ref 70–99)
HBA1C MFR BLD: 5.4 %
HCT VFR BLD AUTO: 49.2 % (ref 40.5–52.5)
HDLC SERPL-MCNC: 45 MG/DL (ref 40–60)
HGB BLD-MCNC: 16.6 G/DL (ref 13.5–17.5)
LDLC SERPL CALC-MCNC: 166 MG/DL
LYMPHOCYTES # BLD: 2.3 K/UL (ref 1–5.1)
LYMPHOCYTES NFR BLD: 31.7 %
MCH RBC QN AUTO: 29.7 PG (ref 26–34)
MCHC RBC AUTO-ENTMCNC: 33.7 G/DL (ref 31–36)
MCV RBC AUTO: 88 FL (ref 80–100)
MONOCYTES # BLD: 0.5 K/UL (ref 0–1.3)
MONOCYTES NFR BLD: 7.1 %
NEUTROPHILS # BLD: 4.2 K/UL (ref 1.7–7.7)
NEUTROPHILS NFR BLD: 56.8 %
PLATELET # BLD AUTO: 225 K/UL (ref 135–450)
PMV BLD AUTO: 9.4 FL (ref 5–10.5)
POTASSIUM SERPL-SCNC: 4.5 MMOL/L (ref 3.5–5.1)
PROT SERPL-MCNC: 7.7 G/DL (ref 6.4–8.2)
RBC # BLD AUTO: 5.59 M/UL (ref 4.2–5.9)
SODIUM SERPL-SCNC: 136 MMOL/L (ref 136–145)
TRIGL SERPL-MCNC: 232 MG/DL (ref 0–150)
TSH SERPL DL<=0.005 MIU/L-ACNC: 3.17 UIU/ML (ref 0.27–4.2)
VLDLC SERPL CALC-MCNC: 46 MG/DL
WBC # BLD AUTO: 7.4 K/UL (ref 4–11)

## 2023-06-28 LAB
HCV RNA SERPL NAA+PROBE-ACNC: ABNORMAL IU/ML
HCV RNA SERPL NAA+PROBE-LOG IU: 5.39 LOG IU/ML
HCV RNA SERPL QL NAA+PROBE: DETECTED

## 2023-07-12 ENCOUNTER — OFFICE VISIT (OUTPATIENT)
Dept: FAMILY MEDICINE CLINIC | Age: 33
End: 2023-07-12
Payer: COMMERCIAL

## 2023-07-12 VITALS
DIASTOLIC BLOOD PRESSURE: 77 MMHG | OXYGEN SATURATION: 96 % | HEART RATE: 75 BPM | BODY MASS INDEX: 39.68 KG/M2 | RESPIRATION RATE: 16 BRPM | TEMPERATURE: 97.1 F | HEIGHT: 72 IN | WEIGHT: 293 LBS | SYSTOLIC BLOOD PRESSURE: 108 MMHG

## 2023-07-12 DIAGNOSIS — E66.01 CLASS 2 SEVERE OBESITY DUE TO EXCESS CALORIES WITH SERIOUS COMORBIDITY AND BODY MASS INDEX (BMI) OF 39.0 TO 39.9 IN ADULT (HCC): ICD-10-CM

## 2023-07-12 DIAGNOSIS — E03.9 HYPOTHYROIDISM, UNSPECIFIED TYPE: ICD-10-CM

## 2023-07-12 DIAGNOSIS — F41.9 ANXIETY: ICD-10-CM

## 2023-07-12 DIAGNOSIS — Z72.0 TOBACCO ABUSE: ICD-10-CM

## 2023-07-12 DIAGNOSIS — Z95.0 PACEMAKER: ICD-10-CM

## 2023-07-12 DIAGNOSIS — F20.9 SCHIZOPHRENIA, UNSPECIFIED TYPE (HCC): ICD-10-CM

## 2023-07-12 DIAGNOSIS — R74.8 ELEVATED LIVER ENZYMES: ICD-10-CM

## 2023-07-12 DIAGNOSIS — B18.2 CHRONIC HEPATITIS C WITHOUT HEPATIC COMA (HCC): Primary | ICD-10-CM

## 2023-07-12 PROBLEM — E66.812 CLASS 2 SEVERE OBESITY DUE TO EXCESS CALORIES WITH SERIOUS COMORBIDITY AND BODY MASS INDEX (BMI) OF 39.0 TO 39.9 IN ADULT: Status: ACTIVE | Noted: 2023-07-12

## 2023-07-12 PROCEDURE — 4004F PT TOBACCO SCREEN RCVD TLK: CPT | Performed by: NURSE PRACTITIONER

## 2023-07-12 PROCEDURE — G8427 DOCREV CUR MEDS BY ELIG CLIN: HCPCS | Performed by: NURSE PRACTITIONER

## 2023-07-12 PROCEDURE — 99214 OFFICE O/P EST MOD 30 MIN: CPT | Performed by: NURSE PRACTITIONER

## 2023-07-12 PROCEDURE — G8417 CALC BMI ABV UP PARAM F/U: HCPCS | Performed by: NURSE PRACTITIONER

## 2023-07-12 RX ORDER — LEVOTHYROXINE SODIUM 0.05 MG/1
50 TABLET ORAL DAILY
Qty: 90 TABLET | Refills: 3 | Status: SHIPPED | OUTPATIENT
Start: 2023-07-12

## 2023-07-12 SDOH — ECONOMIC STABILITY: FOOD INSECURITY: WITHIN THE PAST 12 MONTHS, THE FOOD YOU BOUGHT JUST DIDN'T LAST AND YOU DIDN'T HAVE MONEY TO GET MORE.: NEVER TRUE

## 2023-07-12 SDOH — ECONOMIC STABILITY: INCOME INSECURITY: HOW HARD IS IT FOR YOU TO PAY FOR THE VERY BASICS LIKE FOOD, HOUSING, MEDICAL CARE, AND HEATING?: NOT HARD AT ALL

## 2023-07-12 SDOH — ECONOMIC STABILITY: FOOD INSECURITY: WITHIN THE PAST 12 MONTHS, YOU WORRIED THAT YOUR FOOD WOULD RUN OUT BEFORE YOU GOT MONEY TO BUY MORE.: NEVER TRUE

## 2023-07-12 ASSESSMENT — PATIENT HEALTH QUESTIONNAIRE - PHQ9
1. LITTLE INTEREST OR PLEASURE IN DOING THINGS: 0
SUM OF ALL RESPONSES TO PHQ QUESTIONS 1-9: 0
SUM OF ALL RESPONSES TO PHQ9 QUESTIONS 1 & 2: 0
SUM OF ALL RESPONSES TO PHQ QUESTIONS 1-9: 0
SUM OF ALL RESPONSES TO PHQ QUESTIONS 1-9: 0
DEPRESSION UNABLE TO ASSESS: FUNCTIONAL CAPACITY MOTIVATION LIMITS ACCURACY
SUM OF ALL RESPONSES TO PHQ QUESTIONS 1-9: 0
2. FEELING DOWN, DEPRESSED OR HOPELESS: 0

## 2023-07-12 ASSESSMENT — ANXIETY QUESTIONNAIRES
GAD7 TOTAL SCORE: 0
2. NOT BEING ABLE TO STOP OR CONTROL WORRYING: 0
7. FEELING AFRAID AS IF SOMETHING AWFUL MIGHT HAPPEN: 0
5. BEING SO RESTLESS THAT IT IS HARD TO SIT STILL: 0
4. TROUBLE RELAXING: 0
IF YOU CHECKED OFF ANY PROBLEMS ON THIS QUESTIONNAIRE, HOW DIFFICULT HAVE THESE PROBLEMS MADE IT FOR YOU TO DO YOUR WORK, TAKE CARE OF THINGS AT HOME, OR GET ALONG WITH OTHER PEOPLE: NOT DIFFICULT AT ALL
1. FEELING NERVOUS, ANXIOUS, OR ON EDGE: 0
3. WORRYING TOO MUCH ABOUT DIFFERENT THINGS: 0
6. BECOMING EASILY ANNOYED OR IRRITABLE: 0

## 2023-07-12 ASSESSMENT — ENCOUNTER SYMPTOMS
NAUSEA: 0
CONSTIPATION: 0
DIARRHEA: 0

## 2023-07-12 NOTE — PROGRESS NOTES
2023     Chief Complaint   Patient presents with    Other     Follow up to review labs      Rachell Arechiga (:  1990) is a 29 y.o. male, here for evaluation of the following medical concerns:    HPI    Schizophrenia /Anxiety  Patient was diagnosed with Schizophrenia and anxiety, was hearing voices. No visual hallucinations. Patient  is taking 15 mg Zyprexa twice daily and Buspar 20mg twice daily. Has been taking for the past 1.5 years, started on high dose per patient, did not titrate up. Feels more calm and relaxed. Schedule with new psychiatrist. 23 (here in [place], unsure of provider's name )  Mood has been stable on medications. No AVH. No paranoid behavior. Sometimes feels down and depressed. No SI or HI. No family history of bipolar disorder or Schizophrenia . No manic symptoms. He is sleeping well overall  Hx of substance abuse- he was on suboxone in   Denies any heroin or MJ use  He does smoke 1/4 PPD and also Vape nicotine/CBD/THC some days of the week  Weight gain 100 lbs over last 2 years    Hypothyroidism   Levothyroxine 50 mcg once daily, in the morning. He is compliant with medications. TSH normal range. TSH   Date Value Ref Range Status   2023 3.17 0.27 - 4.20 uIU/mL Final     Sinus node dysfunction (HCC)  S/p dual chamber pacemaker implant 2021 ([employer]tr[employer]) following multiple syncopal episodes  He denies any further syncopal episodes since he had pacemaker implanted     Chronic hepatitis C  He has appointment with [facility] Dr. Miguel Angel Rojo  ALT/AST elevated   HCV viral load 246,507  No abdominal pain, nausea or vomiting    Review of Systems   Constitutional:  Positive for fatigue. Negative for diaphoresis. Cardiovascular:  Negative for chest pain and palpitations. Gastrointestinal:  Negative for constipation, diarrhea and nausea. Endocrine: Negative for cold intolerance and heat intolerance. Musculoskeletal:  Negative for myalgias.

## 2023-07-17 ENCOUNTER — TELEPHONE (OUTPATIENT)
Dept: FAMILY MEDICINE CLINIC | Age: 33
End: 2023-07-17

## 2023-07-17 NOTE — TELEPHONE ENCOUNTER
Jose Villa contacted [employer] they said they have not received it. Letter reprinted and placed on Alejandra's desk for signature. Please fax to [phone] attn: Pam Lim when signed.

## 2023-07-17 NOTE — TELEPHONE ENCOUNTER
Pt called asking if the paperwork he requested be sent to [employer] was sent. Please call back and advise.

## 2023-07-28 DIAGNOSIS — B18.2 CHRONIC HEPATITIS C WITHOUT HEPATIC COMA (HCC): Primary | ICD-10-CM

## 2023-08-03 NOTE — PROGRESS NOTES
[facility], [place]   Cardiac Consultation    Referring Provider:  DESMOND No CNP     Chief Complaint   Patient presents with    Follow-up      Rachell Arechiga   1990    History of Present Illness:   Rachell Arechiga is a 29 y.o. male who is here today for follow up for a past medical history of sinus node dysfunction, ST/SVT, syncope, hepatitis C, heroin abuse, cannabis use, and tobacco/nicotine abuse. In 6/2021, he was evaluated for syncope and ambulatory cardiac monitoring was ordered. Cardiology office was urgently notified of pauses and heart rates in the 30's. He was directed to the ED and admitted. Telemetry review showed recurrent pauses. On 7/19/2021, he underwent dual chamber pacemaker. On 7/20/2021, he underwent RA lead revision. On 8/20/2021, device interrogation showed brief episodes of ST/SVT   Echocardiogram 7/2021 showed an EF of 55%, mild mitral reg. Cardiac event monitor 8/30-9/3/2021 showed predominately atrial paced, rare PAC's/PVC's, PSVT. Today he states she has been feeling well since his last visit. He has not followed with the EP team or device clinic since 2021. He has not been doing device interrogations at home. Patient currently denies any weight gain, edema, palpitations, chest pain, shortness of breath, dizziness, and syncope. He continues to smoke 1/2 ppd. Past Medical History:   has a past medical history of Drug abuse ([address]). Surgical History:   has a past surgical history that includes Mandible surgery and pacemaker placement. Social History:   reports that he has been smoking cigarettes. He has been smoking an average of .25 packs per day. He has never used smokeless tobacco. He reports current drug use. Drug: Marijuana Gerhardt Salle). He reports that he does not drink alcohol. Family History:  Great grandparents with CAD age 72. Home Medications:  Prior to Admission medications    Medication Sig Start Date End Date Taking?  Authorizing

## 2023-08-04 ENCOUNTER — TELEPHONE (OUTPATIENT)
Dept: CARDIOLOGY CLINIC | Age: 33
End: 2023-08-04

## 2023-08-04 ENCOUNTER — OFFICE VISIT (OUTPATIENT)
Dept: CARDIOLOGY CLINIC | Age: 33
End: 2023-08-04
Payer: COMMERCIAL

## 2023-08-04 VITALS
SYSTOLIC BLOOD PRESSURE: 116 MMHG | OXYGEN SATURATION: 96 % | HEIGHT: 72 IN | BODY MASS INDEX: 37.93 KG/M2 | WEIGHT: 280 LBS | HEART RATE: 88 BPM | DIASTOLIC BLOOD PRESSURE: 68 MMHG

## 2023-08-04 DIAGNOSIS — Z95.0 PRESENCE OF PERMANENT CARDIAC PACEMAKER: Primary | ICD-10-CM

## 2023-08-04 DIAGNOSIS — I49.5 SINUS NODE DYSFUNCTION (HCC): ICD-10-CM

## 2023-08-04 PROCEDURE — 99213 OFFICE O/P EST LOW 20 MIN: CPT | Performed by: INTERNAL MEDICINE

## 2023-08-04 PROCEDURE — G8427 DOCREV CUR MEDS BY ELIG CLIN: HCPCS | Performed by: INTERNAL MEDICINE

## 2023-08-04 PROCEDURE — 4004F PT TOBACCO SCREEN RCVD TLK: CPT | Performed by: INTERNAL MEDICINE

## 2023-08-04 PROCEDURE — G8417 CALC BMI ABV UP PARAM F/U: HCPCS | Performed by: INTERNAL MEDICINE

## 2023-08-04 NOTE — PATIENT INSTRUCTIONS
Plan:  Smoking cessation encouraged. Cardiac medications reviewed including indications and pertinent side effects. Medication list updated at this visit.    Check blood pressure and heart rate at home a few times per week- keep a log with dates and times and bring to office visit   Regular exercise and following a healthy diet encouraged   Follow up with me as needed   ~You will get a call to get set up with the EP team and device clinic

## 2023-08-08 ENCOUNTER — HOSPITAL ENCOUNTER (OUTPATIENT)
Dept: ULTRASOUND IMAGING | Age: 33
Discharge: HOME | End: 2023-08-08
Attending: INTERNAL MEDICINE
Payer: COMMERCIAL

## 2023-08-08 DIAGNOSIS — B18.2 CHRONIC HEPATITIS C WITHOUT HEPATIC COMA (HCC): ICD-10-CM

## 2023-08-08 LAB — FERRITIN SERPL IA-MCNC: 229.7 NG/ML (ref 30–400)

## 2023-08-08 PROCEDURE — 76705 ECHO EXAM OF ABDOMEN: CPT

## 2023-08-09 LAB
HBV SURFACE AB SERPL IA-ACNC: >1000 MIU/ML
HBV SURFACE AG SERPL QL IA: NORMAL
IGA SERPL-MCNC: 146 MG/DL (ref 70–400)
IRON SATN MFR SERPL: 34 % (ref 20–50)
IRON SERPL-MCNC: 105 UG/DL (ref 59–158)
TIBC SERPL-MCNC: 307 UG/DL (ref 260–445)
TISSUE TRANSGLUTAMINASE IGA: <0.5 U/ML (ref 0–14)

## 2023-08-10 LAB
HAV AB SER QL IA: POSITIVE
HBV CORE AB SERPL QL IA: NEGATIVE

## 2023-08-15 LAB — HCV GENTYP SERPL NAA+PROBE: NORMAL

## 2023-08-25 DIAGNOSIS — B18.2 CHRONIC HEPATITIS C WITHOUT HEPATIC COMA (HCC): Primary | ICD-10-CM

## 2023-11-13 DIAGNOSIS — B18.2 CHRONIC HEPATITIS C WITHOUT HEPATIC COMA (HCC): Primary | ICD-10-CM

## 2024-01-23 ENCOUNTER — NURSE ONLY (OUTPATIENT)
Dept: CARDIOLOGY CLINIC | Age: 34
End: 2024-01-23

## 2024-01-23 ENCOUNTER — OFFICE VISIT (OUTPATIENT)
Dept: CARDIOLOGY CLINIC | Age: 34
End: 2024-01-23
Payer: COMMERCIAL

## 2024-01-23 VITALS
OXYGEN SATURATION: 95 % | SYSTOLIC BLOOD PRESSURE: 132 MMHG | HEIGHT: 72 IN | BODY MASS INDEX: 39.22 KG/M2 | DIASTOLIC BLOOD PRESSURE: 72 MMHG | HEART RATE: 62 BPM | WEIGHT: 289.6 LBS

## 2024-01-23 DIAGNOSIS — I49.5 SINUS NODE DYSFUNCTION (HCC): ICD-10-CM

## 2024-01-23 DIAGNOSIS — Z95.0 PACEMAKER: Primary | ICD-10-CM

## 2024-01-23 DIAGNOSIS — Z95.0 PRESENCE OF PERMANENT CARDIAC PACEMAKER: Primary | ICD-10-CM

## 2024-01-23 PROCEDURE — 4004F PT TOBACCO SCREEN RCVD TLK: CPT | Performed by: INTERNAL MEDICINE

## 2024-01-23 PROCEDURE — G8484 FLU IMMUNIZE NO ADMIN: HCPCS | Performed by: INTERNAL MEDICINE

## 2024-01-23 PROCEDURE — G8427 DOCREV CUR MEDS BY ELIG CLIN: HCPCS | Performed by: INTERNAL MEDICINE

## 2024-01-23 PROCEDURE — G8417 CALC BMI ABV UP PARAM F/U: HCPCS | Performed by: INTERNAL MEDICINE

## 2024-01-23 PROCEDURE — 99214 OFFICE O/P EST MOD 30 MIN: CPT | Performed by: INTERNAL MEDICINE

## 2024-01-23 NOTE — PATIENT INSTRUCTIONS
RECOMMENDATIONS:  Remote device checks every 3 months- patient will call [employer] to get set up for this.   Continue current medications.   Follow up in one year with EP NP.

## 2024-01-23 NOTE — PROGRESS NOTES
[facility]   Electrophysiology Consult Note            Date: 1/23/24  Patient Name: Rikki Miller  YOB: 1990    Primary Care Physician: Alejandra Andres APRN - CNP    CHIEF COMPLAINT:   Chief Complaint   Patient presents with    Follow-up    Device Check     HISTORY OF PRESENT ILLNESS: Rikki Miller is a 33 y.o. male  with a history of sinus node dysfunction, ST/SVT, syncope, hepatitis C, heroin abuse, cannabis use, and tobacco/nicotine abuse.              In 6/2021, he was evaluated for syncope and ambulatory cardiac monitoring was ordered. Cardiology office was urgently notified of pauses and heart rates in the 30's. He was directed to the ED and admitted. Telemetry review showed recurrent pauses.  On 7/19/2021, he underwent dual chamber pacemaker. On 7/20/2021, he underwent RA lead revision. On 8/20/2021, device interrogation showed brief episodes of ST/SVT. Patient wore a cardiac event monitor from 8/30/2021 to 9/3/2021 which demonstrated predominately SR with an average HR of 87 ().    Today, 1/23/2024, Device interrogation demonstrated AP 40%,  0.1%, some SVT events, NORA 12 years. He reports that he is doing well. He is taking his medications as prescribed. Patient denies current edema, chest pain, sob, palpitations, dizziness or syncope.     Past Medical History:   has a past medical history of Drug abuse (HCC).    Past Surgical History:   has a past surgical history that includes Mandible surgery and pacemaker placement.     Allergies:  Patient has no known allergies.    Social History:   reports that he has been smoking cigarettes. He has never used smokeless tobacco. He reports current drug use. Drug: Marijuana (Weed). He reports that he does not drink alcohol.     Family History: family history is not on file.    Home Medications:    Prior to Admission medications    Medication Sig Start Date End Date Taking? Authorizing Provider   Buprenorphine HCl (SUBUTEX

## 2024-03-06 ENCOUNTER — OFFICE VISIT (OUTPATIENT)
Dept: FAMILY MEDICINE CLINIC | Age: 34
End: 2024-03-06
Payer: COMMERCIAL

## 2024-03-06 VITALS
WEIGHT: 283 LBS | SYSTOLIC BLOOD PRESSURE: 117 MMHG | BODY MASS INDEX: 38.37 KG/M2 | RESPIRATION RATE: 16 BRPM | OXYGEN SATURATION: 95 % | DIASTOLIC BLOOD PRESSURE: 72 MMHG | HEART RATE: 75 BPM | TEMPERATURE: 97.1 F

## 2024-03-06 DIAGNOSIS — R20.2 LEFT LEG PARESTHESIAS: Primary | ICD-10-CM

## 2024-03-06 PROCEDURE — 99213 OFFICE O/P EST LOW 20 MIN: CPT | Performed by: NURSE PRACTITIONER

## 2024-03-06 PROCEDURE — G8484 FLU IMMUNIZE NO ADMIN: HCPCS | Performed by: NURSE PRACTITIONER

## 2024-03-06 PROCEDURE — G8417 CALC BMI ABV UP PARAM F/U: HCPCS | Performed by: NURSE PRACTITIONER

## 2024-03-06 PROCEDURE — 4004F PT TOBACCO SCREEN RCVD TLK: CPT | Performed by: NURSE PRACTITIONER

## 2024-03-06 PROCEDURE — G8427 DOCREV CUR MEDS BY ELIG CLIN: HCPCS | Performed by: NURSE PRACTITIONER

## 2024-03-06 SDOH — ECONOMIC STABILITY: FOOD INSECURITY: WITHIN THE PAST 12 MONTHS, THE FOOD YOU BOUGHT JUST DIDN'T LAST AND YOU DIDN'T HAVE MONEY TO GET MORE.: NEVER TRUE

## 2024-03-06 SDOH — ECONOMIC STABILITY: INCOME INSECURITY: HOW HARD IS IT FOR YOU TO PAY FOR THE VERY BASICS LIKE FOOD, HOUSING, MEDICAL CARE, AND HEATING?: NOT HARD AT ALL

## 2024-03-06 SDOH — ECONOMIC STABILITY: FOOD INSECURITY: WITHIN THE PAST 12 MONTHS, YOU WORRIED THAT YOUR FOOD WOULD RUN OUT BEFORE YOU GOT MONEY TO BUY MORE.: NEVER TRUE

## 2024-03-06 ASSESSMENT — PATIENT HEALTH QUESTIONNAIRE - PHQ9
SUM OF ALL RESPONSES TO PHQ QUESTIONS 1-9: 0
2. FEELING DOWN, DEPRESSED OR HOPELESS: 0
DEPRESSION UNABLE TO ASSESS: FUNCTIONAL CAPACITY MOTIVATION LIMITS ACCURACY
1. LITTLE INTEREST OR PLEASURE IN DOING THINGS: 0
SUM OF ALL RESPONSES TO PHQ QUESTIONS 1-9: 0
SUM OF ALL RESPONSES TO PHQ9 QUESTIONS 1 & 2: 0

## 2024-03-06 ASSESSMENT — ANXIETY QUESTIONNAIRES
6. BECOMING EASILY ANNOYED OR IRRITABLE: 0
7. FEELING AFRAID AS IF SOMETHING AWFUL MIGHT HAPPEN: 0
IF YOU CHECKED OFF ANY PROBLEMS ON THIS QUESTIONNAIRE, HOW DIFFICULT HAVE THESE PROBLEMS MADE IT FOR YOU TO DO YOUR WORK, TAKE CARE OF THINGS AT HOME, OR GET ALONG WITH OTHER PEOPLE: NOT DIFFICULT AT ALL
2. NOT BEING ABLE TO STOP OR CONTROL WORRYING: 0
4. TROUBLE RELAXING: 0
3. WORRYING TOO MUCH ABOUT DIFFERENT THINGS: 0
5. BEING SO RESTLESS THAT IT IS HARD TO SIT STILL: 0
GAD7 TOTAL SCORE: 0

## 2024-03-06 ASSESSMENT — ENCOUNTER SYMPTOMS
SHORTNESS OF BREATH: 0
VOMITING: 0
COUGH: 0
NAUSEA: 0
DIARRHEA: 0

## 2024-03-06 NOTE — PROGRESS NOTES
back pain msotly when he is at work. This is not new. There is no extremity weakness, bowel or bladder dysfunction or saddle anesthesia and he has not had any recent injury.  Since symptoms resolved today and will just recommend monitoring.  If symptoms return, he can try Advil 400 to 600 mg twice daily for 2 to 3 days to see if this helps resolve them.  Will go ahead and check TSH B12 folate and magnesium for further evaluation of these paresthesias.    Return if symptoms worsen or fail to improve.    An electronic signature was used to authenticate this note.    --Alejandra Andres, DESMOND - CNP on 3/6/2024 at 12:16 PM

## 2024-03-06 NOTE — PATIENT INSTRUCTIONS
Get blood work done  Monitor symptoms  If recurrence try taking Advil 400-600 mg twice daily for 2-3 days  Call for any new or worsening of symptoms

## 2024-03-07 DIAGNOSIS — R20.2 LEFT LEG PARESTHESIAS: ICD-10-CM

## 2024-03-07 DIAGNOSIS — B18.2 CHRONIC HEPATITIS C WITHOUT HEPATIC COMA (HCC): ICD-10-CM

## 2024-03-08 LAB
ALBUMIN SERPL-MCNC: 4.4 G/DL (ref 3.4–5)
ALBUMIN/GLOB SERPL: 1.4 {RATIO} (ref 1.1–2.2)
ALP SERPL-CCNC: 91 U/L (ref 40–129)
ALT SERPL-CCNC: 27 U/L (ref 10–40)
ANION GAP SERPL CALCULATED.3IONS-SCNC: 13 MMOL/L (ref 3–16)
AST SERPL-CCNC: 32 U/L (ref 15–37)
BILIRUB SERPL-MCNC: 0.6 MG/DL (ref 0–1)
BUN SERPL-MCNC: 14 MG/DL (ref 7–20)
CALCIUM SERPL-MCNC: 9.8 MG/DL (ref 8.3–10.6)
CHLORIDE SERPL-SCNC: 104 MMOL/L (ref 99–110)
CO2 SERPL-SCNC: 21 MMOL/L (ref 21–32)
CREAT SERPL-MCNC: 0.8 MG/DL (ref 0.9–1.3)
FOLATE SERPL-MCNC: 8.92 NG/ML (ref 4.78–24.2)
GFR SERPLBLD CREATININE-BSD FMLA CKD-EPI: >60 ML/MIN/{1.73_M2}
GLUCOSE SERPL-MCNC: 104 MG/DL (ref 70–99)
MAGNESIUM SERPL-MCNC: 2 MG/DL (ref 1.8–2.4)
POTASSIUM SERPL-SCNC: 4.2 MMOL/L (ref 3.5–5.1)
PROT SERPL-MCNC: 7.6 G/DL (ref 6.4–8.2)
SODIUM SERPL-SCNC: 138 MMOL/L (ref 136–145)
TSH SERPL DL<=0.005 MIU/L-ACNC: 2.41 UIU/ML (ref 0.27–4.2)
VIT B12 SERPL-MCNC: 593 PG/ML (ref 211–911)

## 2024-03-11 LAB
HCV RNA SERPL NAA+PROBE-ACNC: NOT DETECTED IU/ML
HCV RNA SERPL NAA+PROBE-LOG IU: NOT DETECTED LOG IU/ML
HCV RNA SERPL QL NAA+PROBE: NOT DETECTED

## 2024-03-28 ENCOUNTER — OFFICE VISIT (OUTPATIENT)
Dept: FAMILY MEDICINE CLINIC | Age: 34
End: 2024-03-28
Payer: COMMERCIAL

## 2024-03-28 VITALS
DIASTOLIC BLOOD PRESSURE: 70 MMHG | TEMPERATURE: 97.1 F | SYSTOLIC BLOOD PRESSURE: 100 MMHG | HEART RATE: 77 BPM | OXYGEN SATURATION: 97 % | WEIGHT: 288 LBS | BODY MASS INDEX: 39.05 KG/M2

## 2024-03-28 DIAGNOSIS — R11.2 NAUSEA AND VOMITING, UNSPECIFIED VOMITING TYPE: Primary | ICD-10-CM

## 2024-03-28 DIAGNOSIS — F41.9 ANXIETY: ICD-10-CM

## 2024-03-28 DIAGNOSIS — F20.9 SCHIZOPHRENIA, UNSPECIFIED TYPE (HCC): ICD-10-CM

## 2024-03-28 DIAGNOSIS — E03.9 HYPOTHYROIDISM, UNSPECIFIED TYPE: ICD-10-CM

## 2024-03-28 PROCEDURE — G8484 FLU IMMUNIZE NO ADMIN: HCPCS | Performed by: STUDENT IN AN ORGANIZED HEALTH CARE EDUCATION/TRAINING PROGRAM

## 2024-03-28 PROCEDURE — 4004F PT TOBACCO SCREEN RCVD TLK: CPT | Performed by: STUDENT IN AN ORGANIZED HEALTH CARE EDUCATION/TRAINING PROGRAM

## 2024-03-28 PROCEDURE — G8417 CALC BMI ABV UP PARAM F/U: HCPCS | Performed by: STUDENT IN AN ORGANIZED HEALTH CARE EDUCATION/TRAINING PROGRAM

## 2024-03-28 PROCEDURE — 99213 OFFICE O/P EST LOW 20 MIN: CPT | Performed by: STUDENT IN AN ORGANIZED HEALTH CARE EDUCATION/TRAINING PROGRAM

## 2024-03-28 PROCEDURE — G8427 DOCREV CUR MEDS BY ELIG CLIN: HCPCS | Performed by: STUDENT IN AN ORGANIZED HEALTH CARE EDUCATION/TRAINING PROGRAM

## 2024-03-28 RX ORDER — FAMOTIDINE 20 MG/1
20 TABLET, FILM COATED ORAL 2 TIMES DAILY PRN
Qty: 60 TABLET | Refills: 3 | Status: SHIPPED | OUTPATIENT
Start: 2024-03-28

## 2024-03-28 SDOH — ECONOMIC STABILITY: INCOME INSECURITY: HOW HARD IS IT FOR YOU TO PAY FOR THE VERY BASICS LIKE FOOD, HOUSING, MEDICAL CARE, AND HEATING?: NOT VERY HARD

## 2024-03-28 SDOH — ECONOMIC STABILITY: FOOD INSECURITY: WITHIN THE PAST 12 MONTHS, YOU WORRIED THAT YOUR FOOD WOULD RUN OUT BEFORE YOU GOT MONEY TO BUY MORE.: NEVER TRUE

## 2024-03-28 SDOH — ECONOMIC STABILITY: FOOD INSECURITY: WITHIN THE PAST 12 MONTHS, THE FOOD YOU BOUGHT JUST DIDN'T LAST AND YOU DIDN'T HAVE MONEY TO GET MORE.: NEVER TRUE

## 2024-03-28 ASSESSMENT — PATIENT HEALTH QUESTIONNAIRE - PHQ9
SUM OF ALL RESPONSES TO PHQ9 QUESTIONS 1 & 2: 0
1. LITTLE INTEREST OR PLEASURE IN DOING THINGS: NOT AT ALL
SUM OF ALL RESPONSES TO PHQ QUESTIONS 1-9: 0
SUM OF ALL RESPONSES TO PHQ QUESTIONS 1-9: 0
2. FEELING DOWN, DEPRESSED OR HOPELESS: NOT AT ALL
SUM OF ALL RESPONSES TO PHQ QUESTIONS 1-9: 0
SUM OF ALL RESPONSES TO PHQ QUESTIONS 1-9: 0

## 2024-03-28 NOTE — PATIENT INSTRUCTIONS
Try famotidine 20mg twice daily as needed , of before bed   You can also try OTC pepto bismol as needed     Medication schedule   Synthroid/ Levothyroxine empty stomach 60 minutes before meals   --- > Can take other medications with food : Buspar and Zyprexa   Suboxone 30 - 60 minutes before and after eating       ---> How to Get Rid of Nausea From Suboxone  Try a bland snack: Eat crackers, bread or a similar small snack 30 to 60 minutes before your dose.  Don’t swallow the residue: If you’re sensitive to the taste of your medication, spit out any grit when your dose has dissolved..  Rest as needed: Your nausea might worsen if you move quickly. Move your workouts to the evening if running or jumping after your morning Suboxone makes you feel nauseated.   Stay hydrated: Water or a hydration drink (like Pedialyte) could help to calm your digestive system.   Breathe easy. Fresh air, or a quick sniff of peppermint oil, could take your mind off your nausea.

## 2024-03-28 NOTE — PROGRESS NOTES
[facility]  [address]  [address]  [place], [place] [address]  Tel: [phone]      3/28/2024   SUBJECTIVE/OBJECTIVE  HPI    Rikki Miller (:  1990) is a 33 y.o. male, here for evaluation of the following medical concerns:  Chief Complaint   Patient presents with    Nausea & Vomiting     On and off for 2 weeks     Patient is a 33 y.o. male  has a past medical history of Drug abuse (HCC). who presents with intermittent nausea vomiting for 2 weeks.      Nausea & Vomiting  Endorses nausea in the morning with taking medications. Occurs 2-3 times a week. Takes all medications at once. Occurs with eating as well.   Sometimes vomits, once a week. Limited intake, trying to lose weight. Last night ate sausage , perez and eggs. Some bloating. This is a new problem. The current episode started 1 to 4 weeks ago. The problem occurs constantly. The problem has been unchanged. Associated symptoms include nausea, numbness (Numbness - leg , expected) and vomiting (clear, food, stomach acid - yellow gren , no blood). Pertinent negatives include no abdominal pain, anorexia, arthralgias, change in bowel habit, chest pain, congestion, diaphoresis, fatigue, fever, headaches, rash, sore throat, urinary symptoms or visual change. The symptoms are aggravated by eating. He has tried nothing for the symptoms.   Denies any alcohol use.   Smokes marijuana a few times daily, helps with nausea.     Recent blood work from 3/7/2024 metabolic panel within normal limits, hepatitis C negative thyroid-stimulating hormone 2.41   History of sinus node dysfunction, has paceemaker.   Hypothyroidism : Synthroid 50 mcg daily.  Currently on Subutex 8 mg daily for a few months.   Schizophrenia/mood disorder : Taking BuSpar 20 mg twice daily and Zyprexa 5 AM and 15 at night mg daily     Review of Systems   Constitutional:  Negative for diaphoresis, fatigue and fever.   HENT:  Negative for congestion and sore throat.

## 2024-04-23 ENCOUNTER — NURSE ONLY (OUTPATIENT)
Dept: CARDIOLOGY CLINIC | Age: 34
End: 2024-04-23

## 2024-04-23 DIAGNOSIS — Z95.0 PACEMAKER: ICD-10-CM

## 2024-05-15 ENCOUNTER — TELEPHONE (OUTPATIENT)
Dept: FAMILY MEDICINE CLINIC | Age: 34
End: 2024-05-15

## 2024-05-15 DIAGNOSIS — R11.0 NAUSEA: Primary | ICD-10-CM

## 2024-05-15 RX ORDER — ONDANSETRON 4 MG/1
4 TABLET, ORALLY DISINTEGRATING ORAL DAILY PRN
Qty: 7 TABLET | Refills: 0 | Status: SHIPPED | OUTPATIENT
Start: 2024-05-15

## 2024-05-15 NOTE — TELEPHONE ENCOUNTER
Please advise patient Pepcid was given because nausea and vomiting could be a sign of reflux.  I will send in a short course of Zofran, only to be used for severe nausea and vomiting.  Should not be taken daily and should not be used long-term due to risks for irregular heart rate.  Can try over-the-counter suzette candy and Pepto-Bismol.  Should schedule follow-up if nausea persists, please let me know if you would like a referral to gastroenterology for further work up.

## 2024-05-15 NOTE — TELEPHONE ENCOUNTER
Patient is calling in to ask for nausea medication, he saw Dr Lal in march for nausea and she gave him Pepcid which he states that is for acid reflux not nausea and its not helping him.

## 2024-05-21 ENCOUNTER — OFFICE VISIT (OUTPATIENT)
Dept: FAMILY MEDICINE CLINIC | Age: 34
End: 2024-05-21
Payer: COMMERCIAL

## 2024-05-21 VITALS
SYSTOLIC BLOOD PRESSURE: 121 MMHG | HEIGHT: 72 IN | WEIGHT: 280.6 LBS | BODY MASS INDEX: 38.01 KG/M2 | OXYGEN SATURATION: 96 % | DIASTOLIC BLOOD PRESSURE: 83 MMHG | HEART RATE: 89 BPM

## 2024-05-21 DIAGNOSIS — R11.0 NAUSEA: ICD-10-CM

## 2024-05-21 DIAGNOSIS — M62.830 BACK MUSCLE SPASM: Primary | ICD-10-CM

## 2024-05-21 DIAGNOSIS — M54.42 ACUTE BILATERAL LOW BACK PAIN WITH LEFT-SIDED SCIATICA: ICD-10-CM

## 2024-05-21 PROCEDURE — G8417 CALC BMI ABV UP PARAM F/U: HCPCS | Performed by: SURGERY

## 2024-05-21 PROCEDURE — 99214 OFFICE O/P EST MOD 30 MIN: CPT | Performed by: SURGERY

## 2024-05-21 PROCEDURE — 4004F PT TOBACCO SCREEN RCVD TLK: CPT | Performed by: SURGERY

## 2024-05-21 PROCEDURE — G8427 DOCREV CUR MEDS BY ELIG CLIN: HCPCS | Performed by: SURGERY

## 2024-05-21 RX ORDER — TIZANIDINE 2 MG/1
2 TABLET ORAL 3 TIMES DAILY PRN
Qty: 30 TABLET | Refills: 0 | Status: SHIPPED | OUTPATIENT
Start: 2024-05-21

## 2024-05-21 RX ORDER — DICLOFENAC SODIUM 75 MG/1
75 TABLET, DELAYED RELEASE ORAL 2 TIMES DAILY
Qty: 15 TABLET | Refills: 0 | Status: SHIPPED | OUTPATIENT
Start: 2024-05-21

## 2024-05-21 RX ORDER — ONDANSETRON 4 MG/1
4 TABLET, ORALLY DISINTEGRATING ORAL DAILY PRN
Qty: 7 TABLET | Refills: 0 | Status: SHIPPED | OUTPATIENT
Start: 2024-05-21

## 2024-05-21 RX ORDER — LIDOCAINE 50 MG/G
1 PATCH TOPICAL DAILY
Qty: 10 PATCH | Refills: 0 | Status: SHIPPED | OUTPATIENT
Start: 2024-05-21 | End: 2024-05-31

## 2024-05-21 NOTE — PROGRESS NOTES
5/21/2024    This is a 34 y.o. male   Chief Complaint   Patient presents with    Back Pain     Lower back across the whole back,  pain the goes down the Lt leg,    Nausea     Would like something for this it is due to his meds   .    Low back spasm, stands a lot at work difficulty laying down due to pain going down his legs.  Discussed doing stretches in the morning and before bed to help loosen up his low back.     Additionally he notes continued nausea he thinks is related to taking his medications in the morning, he has started getting a Ensure to have something in his stomach with his medications.  He says this has helped but he will still occasionally vomit due to the upset stomach.  Discussed the Pepcid he was started on was to help decrease the acid recommend he takes this at night before bed to help decrease acid present when he wakes.  Again discussed potential side effects of long-term Zofran use but discussed sparing use when Pepcid and Ensure is not sufficient.  Recommend he follow-up with his PCP if he continues to have symptoms despite these measures.         Patient Active Problem List   Diagnosis    Multifocal pneumonia    Positive urine drug screen    Syncope and collapse    Chronic hepatitis C without hepatic coma (HCC)    Heroin abuse (HCC)    Dizziness    Breakthrough seizure (HCC)    Cardiac asystole (HCC)    Acute vomiting    Sinus node dysfunction (HCC)    Tobacco abuse    Pacemaker-MEDTRONIC    Anxiety    Schizophrenia (HCC)    Class 2 severe obesity due to excess calories with serious comorbidity and body mass index (BMI) of 39.0 to 39.9 in adult (HCC)       Current Outpatient Medications   Medication Sig Dispense Refill    tiZANidine (ZANAFLEX) 2 MG tablet Take 1 tablet by mouth 3 times daily as needed (muscle spasm) 30 tablet 0    lidocaine (LIDODERM) 5 % Place 1 patch onto the skin daily for 10 days 12 hours on, 12 hours off. 10 patch 0    ondansetron (ZOFRAN-ODT) 4 MG disintegrating

## 2024-06-18 DIAGNOSIS — R11.0 NAUSEA: ICD-10-CM

## 2024-06-19 RX ORDER — ONDANSETRON 4 MG/1
TABLET, ORALLY DISINTEGRATING ORAL
Qty: 7 TABLET | Refills: 0 | OUTPATIENT
Start: 2024-06-19

## 2024-07-16 ENCOUNTER — TELEPHONE (OUTPATIENT)
Dept: FAMILY MEDICINE CLINIC | Age: 34
End: 2024-07-16

## 2024-07-16 DIAGNOSIS — R11.0 NAUSEA: ICD-10-CM

## 2024-07-16 DIAGNOSIS — E03.9 HYPOTHYROIDISM, UNSPECIFIED TYPE: Primary | ICD-10-CM

## 2024-07-16 RX ORDER — LEVOTHYROXINE SODIUM 0.05 MG/1
50 TABLET ORAL DAILY
Qty: 90 TABLET | Refills: 3 | Status: SHIPPED | OUTPATIENT
Start: 2024-07-16

## 2024-07-16 RX ORDER — ONDANSETRON 4 MG/1
4 TABLET, ORALLY DISINTEGRATING ORAL DAILY PRN
Qty: 7 TABLET | Refills: 0 | Status: SHIPPED | OUTPATIENT
Start: 2024-07-16

## 2024-07-16 NOTE — TELEPHONE ENCOUNTER
Requesting refills;    levothyroxine (SYNTHROID) 50 MCG tablet     ondansetron (ZOFRAN-ODT) 4 MG disintegrating tablet     [id number] - [place], [place] - [address] [place] [address] - P [phone] - F [phone] [[id number]]     3/6/2024 last ov    No future appointments.

## 2024-09-23 ENCOUNTER — OFFICE VISIT (OUTPATIENT)
Dept: FAMILY MEDICINE CLINIC | Age: 34
End: 2024-09-23
Payer: COMMERCIAL

## 2024-09-23 VITALS
DIASTOLIC BLOOD PRESSURE: 80 MMHG | TEMPERATURE: 97.1 F | HEART RATE: 77 BPM | WEIGHT: 283 LBS | RESPIRATION RATE: 16 BRPM | SYSTOLIC BLOOD PRESSURE: 113 MMHG | OXYGEN SATURATION: 97 % | BODY MASS INDEX: 38.38 KG/M2

## 2024-09-23 DIAGNOSIS — F20.9 SCHIZOPHRENIA, UNSPECIFIED TYPE (HCC): ICD-10-CM

## 2024-09-23 DIAGNOSIS — E66.09 CLASS 2 OBESITY DUE TO EXCESS CALORIES WITHOUT SERIOUS COMORBIDITY WITH BODY MASS INDEX (BMI) OF 38.0 TO 38.9 IN ADULT: ICD-10-CM

## 2024-09-23 DIAGNOSIS — E03.9 HYPOTHYROIDISM, UNSPECIFIED TYPE: Primary | ICD-10-CM

## 2024-09-23 PROCEDURE — 99214 OFFICE O/P EST MOD 30 MIN: CPT | Performed by: NURSE PRACTITIONER

## 2024-09-23 PROCEDURE — 4004F PT TOBACCO SCREEN RCVD TLK: CPT | Performed by: NURSE PRACTITIONER

## 2024-09-23 PROCEDURE — G8427 DOCREV CUR MEDS BY ELIG CLIN: HCPCS | Performed by: NURSE PRACTITIONER

## 2024-09-23 PROCEDURE — G8417 CALC BMI ABV UP PARAM F/U: HCPCS | Performed by: NURSE PRACTITIONER

## 2024-09-23 SDOH — ECONOMIC STABILITY: FOOD INSECURITY: WITHIN THE PAST 12 MONTHS, YOU WORRIED THAT YOUR FOOD WOULD RUN OUT BEFORE YOU GOT MONEY TO BUY MORE.: NEVER TRUE

## 2024-09-23 SDOH — ECONOMIC STABILITY: FOOD INSECURITY: WITHIN THE PAST 12 MONTHS, THE FOOD YOU BOUGHT JUST DIDN'T LAST AND YOU DIDN'T HAVE MONEY TO GET MORE.: NEVER TRUE

## 2024-09-23 SDOH — ECONOMIC STABILITY: INCOME INSECURITY: HOW HARD IS IT FOR YOU TO PAY FOR THE VERY BASICS LIKE FOOD, HOUSING, MEDICAL CARE, AND HEATING?: NOT HARD AT ALL

## 2024-09-23 ASSESSMENT — ANXIETY QUESTIONNAIRES
6. BECOMING EASILY ANNOYED OR IRRITABLE: NOT AT ALL
1. FEELING NERVOUS, ANXIOUS, OR ON EDGE: NOT AT ALL
IF YOU CHECKED OFF ANY PROBLEMS ON THIS QUESTIONNAIRE, HOW DIFFICULT HAVE THESE PROBLEMS MADE IT FOR YOU TO DO YOUR WORK, TAKE CARE OF THINGS AT HOME, OR GET ALONG WITH OTHER PEOPLE: NOT DIFFICULT AT ALL
3. WORRYING TOO MUCH ABOUT DIFFERENT THINGS: NOT AT ALL
5. BEING SO RESTLESS THAT IT IS HARD TO SIT STILL: NOT AT ALL
2. NOT BEING ABLE TO STOP OR CONTROL WORRYING: NOT AT ALL
7. FEELING AFRAID AS IF SOMETHING AWFUL MIGHT HAPPEN: NOT AT ALL
GAD7 TOTAL SCORE: 0
4. TROUBLE RELAXING: NOT AT ALL

## 2024-09-23 ASSESSMENT — PATIENT HEALTH QUESTIONNAIRE - PHQ9
2. FEELING DOWN, DEPRESSED OR HOPELESS: NOT AT ALL
SUM OF ALL RESPONSES TO PHQ QUESTIONS 1-9: 0
DEPRESSION UNABLE TO ASSESS: FUNCTIONAL CAPACITY MOTIVATION LIMITS ACCURACY
SUM OF ALL RESPONSES TO PHQ QUESTIONS 1-9: 0
1. LITTLE INTEREST OR PLEASURE IN DOING THINGS: NOT AT ALL
SUM OF ALL RESPONSES TO PHQ9 QUESTIONS 1 & 2: 0

## 2024-09-25 ENCOUNTER — TELEMEDICINE (OUTPATIENT)
Age: 34
End: 2024-09-25

## 2024-09-25 DIAGNOSIS — R05.1 ACUTE COUGH: ICD-10-CM

## 2024-09-25 DIAGNOSIS — B96.89 ACUTE BACTERIAL BRONCHITIS: ICD-10-CM

## 2024-09-25 DIAGNOSIS — R06.2 WHEEZING: Primary | ICD-10-CM

## 2024-09-25 DIAGNOSIS — J20.8 ACUTE BACTERIAL BRONCHITIS: ICD-10-CM

## 2024-09-25 RX ORDER — AZITHROMYCIN 250 MG/1
TABLET, FILM COATED ORAL
Qty: 6 TABLET | Refills: 0 | Status: SHIPPED | OUTPATIENT
Start: 2024-09-25 | End: 2024-10-05

## 2024-09-25 RX ORDER — METHYLPREDNISOLONE 4 MG
TABLET, DOSE PACK ORAL
Qty: 1 KIT | Refills: 0 | Status: SHIPPED | OUTPATIENT
Start: 2024-09-25 | End: 2024-10-01

## 2024-09-25 RX ORDER — ALBUTEROL SULFATE 90 UG/1
2 INHALANT RESPIRATORY (INHALATION) EVERY 6 HOURS PRN
Qty: 18 G | Refills: 1 | Status: SHIPPED | OUTPATIENT
Start: 2024-09-25

## 2024-09-25 RX ORDER — DEXTROMETHORPHAN HBR AND GUAIFENESIN 5; 100 MG/5ML; MG/5ML
20 LIQUID ORAL EVERY 4 HOURS PRN
Qty: 256 ML | Refills: 0 | Status: SHIPPED | OUTPATIENT
Start: 2024-09-25 | End: 2024-10-02

## 2024-09-25 RX ORDER — BENZONATATE 100 MG/1
100-200 CAPSULE ORAL 3 TIMES DAILY PRN
Qty: 30 CAPSULE | Refills: 0 | Status: SHIPPED | OUTPATIENT
Start: 2024-09-25 | End: 2024-10-02

## 2024-09-25 ASSESSMENT — ENCOUNTER SYMPTOMS
DIARRHEA: 0
TROUBLE SWALLOWING: 0
SORE THROAT: 1
CHEST TIGHTNESS: 0
VOMITING: 0
COUGH: 1
NAUSEA: 0
SHORTNESS OF BREATH: 0
WHEEZING: 1
RHINORRHEA: 1

## 2024-09-26 ENCOUNTER — TELEPHONE (OUTPATIENT)
Dept: FAMILY MEDICINE CLINIC | Age: 34
End: 2024-09-26

## 2024-09-26 DIAGNOSIS — R05.1 ACUTE COUGH: ICD-10-CM

## 2024-10-22 PROCEDURE — 93296 REM INTERROG EVL PM/IDS: CPT | Performed by: INTERNAL MEDICINE

## 2024-10-22 PROCEDURE — 93294 REM INTERROG EVL PM/LDLS PM: CPT | Performed by: INTERNAL MEDICINE
